# Patient Record
Sex: MALE | Race: WHITE | NOT HISPANIC OR LATINO
[De-identification: names, ages, dates, MRNs, and addresses within clinical notes are randomized per-mention and may not be internally consistent; named-entity substitution may affect disease eponyms.]

---

## 2018-02-28 ENCOUNTER — APPOINTMENT (OUTPATIENT)
Dept: DERMATOLOGY | Facility: CLINIC | Age: 58
End: 2018-02-28
Payer: MEDICAID

## 2018-02-28 DIAGNOSIS — Z41.1 ENCOUNTER FOR COSMETIC SURGERY: ICD-10-CM

## 2018-02-28 DIAGNOSIS — L91.8 OTHER HYPERTROPHIC DISORDERS OF THE SKIN: ICD-10-CM

## 2018-02-28 DIAGNOSIS — L57.0 ACTINIC KERATOSIS: ICD-10-CM

## 2018-02-28 DIAGNOSIS — L57.8 OTHER SKIN CHANGES DUE TO CHRONIC EXPOSURE TO NONIONIZING RADIATION: ICD-10-CM

## 2018-02-28 PROCEDURE — 17000 DESTRUCT PREMALG LESION: CPT

## 2018-02-28 PROCEDURE — D0125: CPT | Mod: 59

## 2018-02-28 PROCEDURE — 99203 OFFICE O/P NEW LOW 30 MIN: CPT | Mod: 25

## 2018-02-28 PROCEDURE — 17003 DESTRUCT PREMALG LES 2-14: CPT

## 2018-03-12 ENCOUNTER — EMERGENCY (EMERGENCY)
Facility: HOSPITAL | Age: 58
LOS: 0 days | Discharge: HOME | End: 2018-03-12
Attending: EMERGENCY MEDICINE | Admitting: EMERGENCY MEDICINE
Payer: MEDICAID

## 2018-03-12 VITALS
DIASTOLIC BLOOD PRESSURE: 97 MMHG | SYSTOLIC BLOOD PRESSURE: 165 MMHG | TEMPERATURE: 98 F | HEART RATE: 73 BPM | RESPIRATION RATE: 19 BRPM | OXYGEN SATURATION: 95 %

## 2018-03-12 VITALS — DIASTOLIC BLOOD PRESSURE: 79 MMHG | SYSTOLIC BLOOD PRESSURE: 153 MMHG | HEART RATE: 65 BPM

## 2018-03-12 DIAGNOSIS — R42 DIZZINESS AND GIDDINESS: ICD-10-CM

## 2018-03-12 DIAGNOSIS — R07.9 CHEST PAIN, UNSPECIFIED: ICD-10-CM

## 2018-03-12 DIAGNOSIS — M54.9 DORSALGIA, UNSPECIFIED: ICD-10-CM

## 2018-03-12 DIAGNOSIS — S83.512S SPRAIN OF ANTERIOR CRUCIATE LIGAMENT OF LEFT KNEE, SEQUELA: Chronic | ICD-10-CM

## 2018-03-12 DIAGNOSIS — I10 ESSENTIAL (PRIMARY) HYPERTENSION: ICD-10-CM

## 2018-03-12 DIAGNOSIS — J34.2 DEVIATED NASAL SEPTUM: Chronic | ICD-10-CM

## 2018-03-12 DIAGNOSIS — G47.30 SLEEP APNEA, UNSPECIFIED: ICD-10-CM

## 2018-03-12 LAB
ALBUMIN SERPL ELPH-MCNC: 4.2 G/DL — SIGNIFICANT CHANGE UP (ref 3.3–5)
ALP SERPL-CCNC: 107 U/L — SIGNIFICANT CHANGE UP (ref 40–120)
ALT FLD-CCNC: 75 U/L — SIGNIFICANT CHANGE UP (ref 12–78)
ANION GAP SERPL CALC-SCNC: 6 MMOL/L — SIGNIFICANT CHANGE UP (ref 5–17)
APTT BLD: 33.3 SEC — SIGNIFICANT CHANGE UP (ref 27.5–37.4)
AST SERPL-CCNC: 34 U/L — SIGNIFICANT CHANGE UP (ref 15–37)
BASOPHILS # BLD AUTO: 0.06 K/UL — SIGNIFICANT CHANGE UP (ref 0–0.2)
BASOPHILS NFR BLD AUTO: 0.8 % — SIGNIFICANT CHANGE UP (ref 0–2)
BILIRUB SERPL-MCNC: 0.5 MG/DL — SIGNIFICANT CHANGE UP (ref 0.2–1.2)
BUN SERPL-MCNC: 16 MG/DL — SIGNIFICANT CHANGE UP (ref 7–23)
CALCIUM SERPL-MCNC: 9.1 MG/DL — SIGNIFICANT CHANGE UP (ref 8.5–10.1)
CHLORIDE SERPL-SCNC: 109 MMOL/L — HIGH (ref 96–108)
CO2 SERPL-SCNC: 29 MMOL/L — SIGNIFICANT CHANGE UP (ref 22–31)
CREAT SERPL-MCNC: 1.04 MG/DL — SIGNIFICANT CHANGE UP (ref 0.5–1.3)
EOSINOPHIL # BLD AUTO: 0.1 K/UL — SIGNIFICANT CHANGE UP (ref 0–0.5)
EOSINOPHIL NFR BLD AUTO: 1.3 % — SIGNIFICANT CHANGE UP (ref 0–6)
GLUCOSE SERPL-MCNC: 80 MG/DL — SIGNIFICANT CHANGE UP (ref 70–99)
HCT VFR BLD CALC: 52.1 % — HIGH (ref 39–50)
HGB BLD-MCNC: 17.3 G/DL — HIGH (ref 13–17)
IMM GRANULOCYTES NFR BLD AUTO: 0.4 % — SIGNIFICANT CHANGE UP (ref 0–1.5)
INR BLD: 1.01 RATIO — SIGNIFICANT CHANGE UP (ref 0.88–1.16)
LYMPHOCYTES # BLD AUTO: 1.79 K/UL — SIGNIFICANT CHANGE UP (ref 1–3.3)
LYMPHOCYTES # BLD AUTO: 22.7 % — SIGNIFICANT CHANGE UP (ref 13–44)
MCHC RBC-ENTMCNC: 31.6 PG — SIGNIFICANT CHANGE UP (ref 27–34)
MCHC RBC-ENTMCNC: 33.2 GM/DL — SIGNIFICANT CHANGE UP (ref 32–36)
MCV RBC AUTO: 95.1 FL — SIGNIFICANT CHANGE UP (ref 80–100)
MONOCYTES # BLD AUTO: 0.87 K/UL — SIGNIFICANT CHANGE UP (ref 0–0.9)
MONOCYTES NFR BLD AUTO: 11 % — SIGNIFICANT CHANGE UP (ref 2–14)
NEUTROPHILS # BLD AUTO: 5.05 K/UL — SIGNIFICANT CHANGE UP (ref 1.8–7.4)
NEUTROPHILS NFR BLD AUTO: 63.8 % — SIGNIFICANT CHANGE UP (ref 43–77)
NRBC # BLD: 0 /100 WBCS — SIGNIFICANT CHANGE UP (ref 0–0)
PLATELET # BLD AUTO: 162 K/UL — SIGNIFICANT CHANGE UP (ref 150–400)
POTASSIUM SERPL-MCNC: 4.5 MMOL/L — SIGNIFICANT CHANGE UP (ref 3.5–5.3)
POTASSIUM SERPL-SCNC: 4.5 MMOL/L — SIGNIFICANT CHANGE UP (ref 3.5–5.3)
PROT SERPL-MCNC: 7.5 GM/DL — SIGNIFICANT CHANGE UP (ref 6–8.3)
PROTHROM AB SERPL-ACNC: 10.9 SEC — SIGNIFICANT CHANGE UP (ref 9.8–12.7)
RBC # BLD: 5.48 M/UL — SIGNIFICANT CHANGE UP (ref 4.2–5.8)
RBC # FLD: 12.4 % — SIGNIFICANT CHANGE UP (ref 10.3–14.5)
SODIUM SERPL-SCNC: 144 MMOL/L — SIGNIFICANT CHANGE UP (ref 135–145)
TROPONIN I SERPL-MCNC: <0.015 NG/ML — SIGNIFICANT CHANGE UP (ref 0.01–0.04)
TROPONIN I SERPL-MCNC: <0.015 NG/ML — SIGNIFICANT CHANGE UP (ref 0.01–0.04)
WBC # BLD: 7.9 K/UL — SIGNIFICANT CHANGE UP (ref 3.8–10.5)
WBC # FLD AUTO: 7.9 K/UL — SIGNIFICANT CHANGE UP (ref 3.8–10.5)

## 2018-03-12 PROCEDURE — 71045 X-RAY EXAM CHEST 1 VIEW: CPT | Mod: 26

## 2018-03-12 PROCEDURE — 93010 ELECTROCARDIOGRAM REPORT: CPT

## 2018-03-12 PROCEDURE — 99285 EMERGENCY DEPT VISIT HI MDM: CPT

## 2018-03-12 RX ORDER — SODIUM CHLORIDE 9 MG/ML
3 INJECTION INTRAMUSCULAR; INTRAVENOUS; SUBCUTANEOUS ONCE
Qty: 0 | Refills: 0 | Status: COMPLETED | OUTPATIENT
Start: 2018-03-12 | End: 2018-03-12

## 2018-03-12 RX ORDER — ASPIRIN/CALCIUM CARB/MAGNESIUM 324 MG
325 TABLET ORAL ONCE
Qty: 0 | Refills: 0 | Status: COMPLETED | OUTPATIENT
Start: 2018-03-12 | End: 2018-03-12

## 2018-03-12 RX ADMIN — Medication 325 MILLIGRAM(S): at 19:30

## 2018-03-12 RX ADMIN — SODIUM CHLORIDE 3 MILLILITER(S): 9 INJECTION INTRAMUSCULAR; INTRAVENOUS; SUBCUTANEOUS at 19:30

## 2018-03-12 NOTE — ED PROVIDER NOTE - NS_ ATTENDINGSCRIBEDETAILS _ED_A_ED_FT
I, Taz Graham MD,  performed the initial face to face bedside interview with this patient regarding history of present illness, review of symptoms and relevant past medical, social and family history.  I completed an independent physical examination.    The history, relevant review of systems, past medical and surgical history, medical decision making, and physical examination was documented by the scribe in my presence and I attest to the accuracy of the documentation.

## 2018-03-12 NOTE — ED STATDOCS - PROGRESS NOTE DETAILS
Anastasiia Rogelio Jaffe: 57 y-o Male with PMHX of HTN presents to the ED c/o of worsening Chest tightness and dizziness x 2 weeks. Pt notes pain is to the left of sternum. Sent by Dr. Rousseau for EKG. Pt notes numbness and tingling to b/l hands. Pain worsens with movement with exertional dyspnea / SOB. + mild diaphoresis. Denies N/V, Cough, leg swelling. Took Asprin 80 mg prior to arrival. Pt will be sent to main for further evaluation.

## 2018-03-12 NOTE — ED PROVIDER NOTE - CARDIAC, MLM
Normal rate, regular rhythm.  Heart sounds S1, S2.  No murmurs, rubs or gallops. +tender to left costochondral angle

## 2018-03-12 NOTE — ED ADULT NURSE REASSESSMENT NOTE - NS ED NURSE REASSESS COMMENT FT1
report received from Joanne Bloom RN and resumed care of patient at 1930. pt aaox4 resting comfortably on stretcher, awaiting 2nd set of troponin to be drawn around 2200pm.

## 2018-03-12 NOTE — ED PROVIDER NOTE - PROGRESS NOTE DETAILS
Spoke with patient who states that he has not followed with cardio and also that he has not used CPAP machine over night in years. Pt states he will start using the machine and follow with cardio referred to.

## 2018-03-12 NOTE — ED STATDOCS - OBJECTIVE STATEMENT
57 y-o Male with PMHX of HTN presents to the ED c/o of worsening Chest tightness and dizziness x 2 weeks. Pt notes pain is to the left of sternum. Sent by Dr. Rousseau for EKG. Pt notes numbness and tingling to b/l hands. Pain worsens with movement with exertional dyspnea / SOB. + mild diaphoresis. Denies N/V, Cough, leg swelling. Took Asprin 80 mg prior to arrival. Pt will be sent to main for further evaluation.

## 2018-05-16 ENCOUNTER — TRANSCRIPTION ENCOUNTER (OUTPATIENT)
Age: 58
End: 2018-05-16

## 2018-05-23 ENCOUNTER — TRANSCRIPTION ENCOUNTER (OUTPATIENT)
Age: 58
End: 2018-05-23

## 2019-02-27 ENCOUNTER — APPOINTMENT (OUTPATIENT)
Dept: DERMATOLOGY | Facility: CLINIC | Age: 59
End: 2019-02-27

## 2019-04-30 ENCOUNTER — INPATIENT (INPATIENT)
Facility: HOSPITAL | Age: 59
LOS: 0 days | Discharge: HOME | End: 2019-05-01
Attending: INTERNAL MEDICINE | Admitting: INTERNAL MEDICINE
Payer: MEDICAID

## 2019-04-30 VITALS — HEIGHT: 67 IN | WEIGHT: 285.06 LBS

## 2019-04-30 DIAGNOSIS — S83.512S SPRAIN OF ANTERIOR CRUCIATE LIGAMENT OF LEFT KNEE, SEQUELA: Chronic | ICD-10-CM

## 2019-04-30 DIAGNOSIS — J34.2 DEVIATED NASAL SEPTUM: Chronic | ICD-10-CM

## 2019-04-30 PROBLEM — M94.0 CHONDROCOSTAL JUNCTION SYNDROME [TIETZE]: Chronic | Status: ACTIVE | Noted: 2018-03-12

## 2019-04-30 PROBLEM — G47.30 SLEEP APNEA, UNSPECIFIED: Chronic | Status: ACTIVE | Noted: 2018-03-12

## 2019-04-30 PROBLEM — I10 ESSENTIAL (PRIMARY) HYPERTENSION: Chronic | Status: ACTIVE | Noted: 2018-03-12

## 2019-04-30 LAB
ADD ON TEST-SPECIMEN IN LAB: SIGNIFICANT CHANGE UP
ALBUMIN SERPL ELPH-MCNC: 4 G/DL — SIGNIFICANT CHANGE UP (ref 3.3–5)
ALP SERPL-CCNC: 110 U/L — SIGNIFICANT CHANGE UP (ref 40–120)
ALT FLD-CCNC: 53 U/L — SIGNIFICANT CHANGE UP (ref 12–78)
ANION GAP SERPL CALC-SCNC: 4 MMOL/L — LOW (ref 5–17)
APTT BLD: 33.5 SEC — SIGNIFICANT CHANGE UP (ref 27.5–36.3)
AST SERPL-CCNC: 23 U/L — SIGNIFICANT CHANGE UP (ref 15–37)
BASOPHILS # BLD AUTO: 0.04 K/UL — SIGNIFICANT CHANGE UP (ref 0–0.2)
BASOPHILS NFR BLD AUTO: 0.5 % — SIGNIFICANT CHANGE UP (ref 0–2)
BILIRUB SERPL-MCNC: 0.5 MG/DL — SIGNIFICANT CHANGE UP (ref 0.2–1.2)
BUN SERPL-MCNC: 14 MG/DL — SIGNIFICANT CHANGE UP (ref 7–23)
CALCIUM SERPL-MCNC: 9.2 MG/DL — SIGNIFICANT CHANGE UP (ref 8.5–10.1)
CHLORIDE SERPL-SCNC: 110 MMOL/L — HIGH (ref 96–108)
CO2 SERPL-SCNC: 30 MMOL/L — SIGNIFICANT CHANGE UP (ref 22–31)
CREAT SERPL-MCNC: 0.99 MG/DL — SIGNIFICANT CHANGE UP (ref 0.5–1.3)
EOSINOPHIL # BLD AUTO: 0.11 K/UL — SIGNIFICANT CHANGE UP (ref 0–0.5)
EOSINOPHIL NFR BLD AUTO: 1.3 % — SIGNIFICANT CHANGE UP (ref 0–6)
GLUCOSE SERPL-MCNC: 85 MG/DL — SIGNIFICANT CHANGE UP (ref 70–99)
HCT VFR BLD CALC: 52.2 % — HIGH (ref 39–50)
HGB BLD-MCNC: 17.3 G/DL — HIGH (ref 13–17)
IMM GRANULOCYTES NFR BLD AUTO: 0.6 % — SIGNIFICANT CHANGE UP (ref 0–1.5)
INR BLD: 0.94 RATIO — SIGNIFICANT CHANGE UP (ref 0.88–1.16)
LYMPHOCYTES # BLD AUTO: 1.45 K/UL — SIGNIFICANT CHANGE UP (ref 1–3.3)
LYMPHOCYTES # BLD AUTO: 17.4 % — SIGNIFICANT CHANGE UP (ref 13–44)
MCHC RBC-ENTMCNC: 31.9 PG — SIGNIFICANT CHANGE UP (ref 27–34)
MCHC RBC-ENTMCNC: 33.1 GM/DL — SIGNIFICANT CHANGE UP (ref 32–36)
MCV RBC AUTO: 96.1 FL — SIGNIFICANT CHANGE UP (ref 80–100)
MONOCYTES # BLD AUTO: 0.79 K/UL — SIGNIFICANT CHANGE UP (ref 0–0.9)
MONOCYTES NFR BLD AUTO: 9.5 % — SIGNIFICANT CHANGE UP (ref 2–14)
NEUTROPHILS # BLD AUTO: 5.87 K/UL — SIGNIFICANT CHANGE UP (ref 1.8–7.4)
NEUTROPHILS NFR BLD AUTO: 70.7 % — SIGNIFICANT CHANGE UP (ref 43–77)
NRBC # BLD: 0 /100 WBCS — SIGNIFICANT CHANGE UP (ref 0–0)
PLATELET # BLD AUTO: 171 K/UL — SIGNIFICANT CHANGE UP (ref 150–400)
POTASSIUM SERPL-MCNC: 4.3 MMOL/L — SIGNIFICANT CHANGE UP (ref 3.5–5.3)
POTASSIUM SERPL-SCNC: 4.3 MMOL/L — SIGNIFICANT CHANGE UP (ref 3.5–5.3)
PROT SERPL-MCNC: 6.9 GM/DL — SIGNIFICANT CHANGE UP (ref 6–8.3)
PROTHROM AB SERPL-ACNC: 10.4 SEC — SIGNIFICANT CHANGE UP (ref 10–12.9)
RBC # BLD: 5.43 M/UL — SIGNIFICANT CHANGE UP (ref 4.2–5.8)
RBC # FLD: 12.1 % — SIGNIFICANT CHANGE UP (ref 10.3–14.5)
SODIUM SERPL-SCNC: 144 MMOL/L — SIGNIFICANT CHANGE UP (ref 135–145)
TROPONIN I SERPL-MCNC: <0.015 NG/ML — SIGNIFICANT CHANGE UP (ref 0.01–0.04)
WBC # BLD: 8.31 K/UL — SIGNIFICANT CHANGE UP (ref 3.8–10.5)
WBC # FLD AUTO: 8.31 K/UL — SIGNIFICANT CHANGE UP (ref 3.8–10.5)

## 2019-04-30 PROCEDURE — 93010 ELECTROCARDIOGRAM REPORT: CPT

## 2019-04-30 PROCEDURE — 99285 EMERGENCY DEPT VISIT HI MDM: CPT

## 2019-04-30 PROCEDURE — 71045 X-RAY EXAM CHEST 1 VIEW: CPT | Mod: 26

## 2019-04-30 RX ORDER — ACETAMINOPHEN 500 MG
650 TABLET ORAL EVERY 6 HOURS
Qty: 0 | Refills: 0 | Status: DISCONTINUED | OUTPATIENT
Start: 2019-04-30 | End: 2019-05-01

## 2019-04-30 RX ORDER — ONDANSETRON 8 MG/1
4 TABLET, FILM COATED ORAL EVERY 6 HOURS
Qty: 0 | Refills: 0 | Status: DISCONTINUED | OUTPATIENT
Start: 2019-04-30 | End: 2019-05-01

## 2019-04-30 RX ORDER — SODIUM CHLORIDE 9 MG/ML
1000 INJECTION INTRAMUSCULAR; INTRAVENOUS; SUBCUTANEOUS
Qty: 0 | Refills: 0 | Status: DISCONTINUED | OUTPATIENT
Start: 2019-04-30 | End: 2019-05-01

## 2019-04-30 RX ORDER — METOPROLOL TARTRATE 50 MG
25 TABLET ORAL
Qty: 0 | Refills: 0 | Status: DISCONTINUED | OUTPATIENT
Start: 2019-04-30 | End: 2019-05-01

## 2019-04-30 RX ORDER — ASPIRIN/CALCIUM CARB/MAGNESIUM 324 MG
81 TABLET ORAL DAILY
Qty: 0 | Refills: 0 | Status: DISCONTINUED | OUTPATIENT
Start: 2019-04-30 | End: 2019-05-01

## 2019-04-30 RX ORDER — AMLODIPINE BESYLATE 2.5 MG/1
10 TABLET ORAL DAILY
Qty: 0 | Refills: 0 | Status: DISCONTINUED | OUTPATIENT
Start: 2019-04-30 | End: 2019-05-01

## 2019-04-30 RX ORDER — AMLODIPINE BESYLATE 2.5 MG/1
1 TABLET ORAL
Qty: 0 | Refills: 0 | COMMUNITY

## 2019-04-30 RX ADMIN — Medication 81 MILLIGRAM(S): at 15:50

## 2019-04-30 RX ADMIN — SODIUM CHLORIDE 75 MILLILITER(S): 9 INJECTION INTRAMUSCULAR; INTRAVENOUS; SUBCUTANEOUS at 21:10

## 2019-04-30 NOTE — ED ADULT TRIAGE NOTE - CHIEF COMPLAINT QUOTE
c/o chest tightness, pt states episode started after taking quinipril and 30 minutes later started to have chest tightness lasting for 4-5 hours, denies shortness of breath, dizziness and nausea, pt went to doctor mic's office today and had EKG completed which was abnormal, was told he has right bundle branch block and sent to ER for consult with dr. haile

## 2019-04-30 NOTE — ED ADULT NURSE REASSESSMENT NOTE - NS ED NURSE REASSESS COMMENT FT1
Patient care received from RN Sean BALDERAS. Patient A&Ox4, resting comfortably in bed. VSS, denies pain/discomfort. No s/s of distress present. Wait time explained, hospitalist consult pending. Safety & comfort measures in place, spouse bedside. Will continue to monitor.

## 2019-04-30 NOTE — CONSULT NOTE ADULT - SUBJECTIVE AND OBJECTIVE BOX
Patient is a 59y old  Male who presents with a chief complaint of     HPI:  Cardio: 59 year old male history of htn had episode of chest tightness 4 days ago approx 40 min after having quinapril pill. felt flushed and red. tightness seemed to last several hours. no dyspna or palps. Patient also describes episode of exertional dyspnea climbing ladder at work approx 2 weeks ago. Past history sig for prior nuc stress which was abnormal. (results na). no pnd orthopnea or le edema.    PAST MEDICAL & SURGICAL HISTORY:  Costochondritis  Sleep apnea  HTN (hypertension)  Rupture of anterior cruciate ligament of left knee, sequela  Deviated nasal septum      PREVIOUS DIAGNOSTIC TESTING:      ECHO  FINDINGS:    STRESS  FINDINGS:    CATHETERIZATION  FINDINGS:    MEDICATIONS  (STANDING):  aspirin  chewable 81 milliGRAM(s) Oral daily    MEDICATIONS  (PRN):      FAMILY HISTORY:      SOCIAL HISTORY:  ***    REVIEW OF SYSTEM:  Pertinent items are noted in HPI.  Constitutional  Eyes:    Ears, nose, mouth,   throat, and face:    Neck:   Respiratory:   and wheezing  Cardiovascular:    Gastrointestinal:  Genitourinary:     Hematologic/lymphatic:   Musculoskeletal:   Neurological:   Behavioral/Psych:        Vital Signs Last 24 Hrs  T(C): 36.6 (30 Apr 2019 17:03), Max: 36.9 (30 Apr 2019 13:34)  T(F): 97.8 (30 Apr 2019 17:03), Max: 98.4 (30 Apr 2019 13:34)  HR: 67 (30 Apr 2019 17:03) (67 - 73)  BP: 146/77 (30 Apr 2019 17:03) (146/77 - 158/76)  BP(mean): --  RR: 16 (30 Apr 2019 17:03) (16 - 19)  SpO2: 97% (30 Apr 2019 17:03) (97% - 98%)    I&O's Summary    PHYSICAL EXAM  General Appearance: cooperative, no acute distress,   HEENT: PERRL, conjunctiva clear, EOM's intact,     Neck: Supple, , no adenopathy, thyroid: not enlarged, no carotid bruit or JVD  Back: Symmetric, no  tenderness,no soft tissue tenderness  Lungs: Clear to auscultation bilaterally,no adventitious breath sounds, normal   expiratory phase  Heart: Regular rate and rhythm, S1, S2 normal, no murmur,   Abdomen: Soft, non-tender, bowel sounds active , no hepatosplenomegaly  Extremities: no cyanosis or edema, no joint swelling  Skin  Neurologic: Alert and oriented X3 ,        INTERPRETATION OF TELEMETRY:    ECG:        LABS:                          17.3   8.31  )-----------( 171      ( 30 Apr 2019 13:40 )             52.2     04-30    144  |  110<H>  |  14  ----------------------------<  85  4.3   |  30  |  0.99    Ca    9.2      30 Apr 2019 13:40    TPro  6.9  /  Alb  4.0  /  TBili  0.5  /  DBili  x   /  AST  23  /  ALT  53  /  AlkPhos  110  04-30    CARDIAC MARKERS ( 30 Apr 2019 13:40 )  <0.015 ng/mL / x     / x     / x     / x              PT/INR - ( 30 Apr 2019 13:40 )   PT: 10.4 sec;   INR: 0.94 ratio         PTT - ( 30 Apr 2019 13:40 )  PTT:33.5 sec          RADIOLOGY & ADDITIONAL STUDIES:    IMPRESSION:    PLAN:

## 2019-04-30 NOTE — ED PROVIDER NOTE - OBJECTIVE STATEMENT
60 y/o M with a PMHx of HTN presenting to the ED c/o chest tightness on exertion. Pt states episode started after taking Quinipril and 30 minutes later started to have chest tightness lasting for 4-5 hours. Pt went to Dr. Ferreira's office today and had EKG completed which was abnormal, was told he has right bundle branch block and sent to ER for consult with Dr. Ferreira. Denies SOB, dizziness, previous heart surgeries, recent travel, h/o blood clots, and nausea. PMD: Dr. Ferreira. Cardiologist: Dr. Webb. Allergy: Codeine. 58 y/o M with a PMHx of HTN presenting to the ED c/o chest tightness on exertion. Pt states episode started after taking Quinipril and 30 minutes later started to have chest tightness lasting for 4-5 hours. Pt went to Dr. Ferreira's office today and had EKG completed which was abnormal, was told he has right bundle branch block and sent to ER for consult with Dr. Ferreira. Denies SOB, dizziness, previous heart surgeries, recent travel, h/o blood clots, and nausea. PMD: Dr. Ferreira. Cardiologist: Dr. Webb. Allergy: Codeine.    pt sent in for rule our acute coronary syndrome and coronary catheterization tomorrow

## 2019-04-30 NOTE — ED ADULT NURSE REASSESSMENT NOTE - NS ED NURSE REASSESS COMMENT FT1
pt received from previous RN Silvana. AOX3, denies pain. no s/s of acute distress noted. vss. bed assigned on 3N, pending report. POC reviewed with pt, verbalizes understanding. will continue to monitor

## 2019-04-30 NOTE — ED ADULT TRIAGE NOTE - MODE OF ARRIVAL
Walk in
I have reviewed and confirmed nurses' notes for patient's medications, allergies, medical history, and surgical history.

## 2019-04-30 NOTE — ED PROVIDER NOTE - PHYSICAL EXAMINATION
nml     M w refractory HTN p/w chest tightr sent for card cath tomorrow. card enzymes and admit. disc dr sac card cath done tmr.

## 2019-04-30 NOTE — H&P ADULT - NSICDXPASTSURGICALHX_GEN_ALL_CORE_FT
PAST SURGICAL HISTORY:  Deviated nasal septum     Rupture of anterior cruciate ligament of left knee, sequela

## 2019-04-30 NOTE — ED ADULT NURSE REASSESSMENT NOTE - NS ED NURSE REASSESS COMMENT FT1
Patient remains as previously assessed. No s/s of distress or reports of pain on my time. Plan of care discussed. Hand-off report to BELINDA INFANTE Safety & comfort measures in place, purposeful active rounding on my time.

## 2019-04-30 NOTE — H&P ADULT - ASSESSMENT
# CP r/o ACS  # hx of positive stress test in past, may need angiogram  # HTN  # JULISSA not compliant with CPAP    Plan  - cont amlodipine  - cardio eval appreciated, possible angio in AM  - weight loss, exercise counseling  - check lipid panel  - complaince with cpap  - hold quinapril given the symptoms occured after taking it    DC after cath

## 2019-04-30 NOTE — H&P ADULT - HISTORY OF PRESENT ILLNESS
58 y/o M with a PMHx of HTN, JULISSA on CPAP, hx of positive stress test 2010 presenting to the ED c/o chest tightness that he experienced after taking quinapril for the first time on saturday. The CP lasted for 6-7 hours during which time he was ambulating, driving, seeing his son in the hospital. Denies nausea/radiation. The CP was "pressure like", felt like "elephant sitting on his chest". He has had similar chest tightnesss 2 weeks ago at work when coming off a ladder with his tools. He has a hx of positive stress test in 2010 and deferred angiogram at that time.

## 2019-04-30 NOTE — CONSULT NOTE ADULT - ASSESSMENT
Imp:  1. Chest pain, possible unstable angina  2. HTN    Plan; admit  asa bb   serial ecg and enzymes  If pain reccurs start iv heparin  early cath.

## 2019-04-30 NOTE — ED PROVIDER NOTE - CLINICAL SUMMARY MEDICAL DECISION MAKING FREE TEXT BOX
Male with refractory HTN p/w chest tightness, sent by cardiology for cardiac cath tomorrow. Will obtain cardiac enzymes and admit. Discussed with Dr. Ferreira who said cardiac cath will be done tomorrow.

## 2019-05-01 ENCOUNTER — OUTPATIENT (OUTPATIENT)
Dept: OUTPATIENT SERVICES | Facility: HOSPITAL | Age: 59
LOS: 1 days | End: 2019-05-01
Payer: MEDICAID

## 2019-05-01 ENCOUNTER — TRANSCRIPTION ENCOUNTER (OUTPATIENT)
Age: 59
End: 2019-05-01

## 2019-05-01 VITALS
DIASTOLIC BLOOD PRESSURE: 71 MMHG | OXYGEN SATURATION: 93 % | SYSTOLIC BLOOD PRESSURE: 136 MMHG | HEART RATE: 66 BPM | RESPIRATION RATE: 18 BRPM | TEMPERATURE: 98 F

## 2019-05-01 DIAGNOSIS — J34.2 DEVIATED NASAL SEPTUM: Chronic | ICD-10-CM

## 2019-05-01 DIAGNOSIS — S83.512S SPRAIN OF ANTERIOR CRUCIATE LIGAMENT OF LEFT KNEE, SEQUELA: Chronic | ICD-10-CM

## 2019-05-01 LAB
ALBUMIN SERPL ELPH-MCNC: 3.4 G/DL — SIGNIFICANT CHANGE UP (ref 3.3–5)
ALP SERPL-CCNC: 89 U/L — SIGNIFICANT CHANGE UP (ref 40–120)
ALT FLD-CCNC: 47 U/L — SIGNIFICANT CHANGE UP (ref 12–78)
ANION GAP SERPL CALC-SCNC: 4 MMOL/L — LOW (ref 5–17)
AST SERPL-CCNC: 20 U/L — SIGNIFICANT CHANGE UP (ref 15–37)
BASOPHILS # BLD AUTO: 0.03 K/UL — SIGNIFICANT CHANGE UP (ref 0–0.2)
BASOPHILS NFR BLD AUTO: 0.5 % — SIGNIFICANT CHANGE UP (ref 0–2)
BILIRUB SERPL-MCNC: 0.7 MG/DL — SIGNIFICANT CHANGE UP (ref 0.2–1.2)
BUN SERPL-MCNC: 15 MG/DL — SIGNIFICANT CHANGE UP (ref 7–23)
CALCIUM SERPL-MCNC: 8.5 MG/DL — SIGNIFICANT CHANGE UP (ref 8.5–10.1)
CHLORIDE SERPL-SCNC: 109 MMOL/L — HIGH (ref 96–108)
CHOLEST SERPL-MCNC: 153 MG/DL — SIGNIFICANT CHANGE UP (ref 10–199)
CO2 SERPL-SCNC: 29 MMOL/L — SIGNIFICANT CHANGE UP (ref 22–31)
CREAT SERPL-MCNC: 1.01 MG/DL — SIGNIFICANT CHANGE UP (ref 0.5–1.3)
EOSINOPHIL # BLD AUTO: 0.14 K/UL — SIGNIFICANT CHANGE UP (ref 0–0.5)
EOSINOPHIL NFR BLD AUTO: 2.3 % — SIGNIFICANT CHANGE UP (ref 0–6)
ESTIMATED AVERAGE GLUCOSE: 123 MG/DL — HIGH (ref 68–114)
GLUCOSE SERPL-MCNC: 113 MG/DL — HIGH (ref 70–99)
HBA1C BLD-MCNC: 5.9 % — HIGH (ref 4–5.6)
HCT VFR BLD CALC: 47.9 % — SIGNIFICANT CHANGE UP (ref 39–50)
HCV AB S/CO SERPL IA: 0.06 S/CO — SIGNIFICANT CHANGE UP (ref 0–0.99)
HCV AB SERPL-IMP: SIGNIFICANT CHANGE UP
HDLC SERPL-MCNC: 40 MG/DL — SIGNIFICANT CHANGE UP
HGB BLD-MCNC: 16.1 G/DL — SIGNIFICANT CHANGE UP (ref 13–17)
IMM GRANULOCYTES NFR BLD AUTO: 0.5 % — SIGNIFICANT CHANGE UP (ref 0–1.5)
LIPID PNL WITH DIRECT LDL SERPL: 89 MG/DL — SIGNIFICANT CHANGE UP
LYMPHOCYTES # BLD AUTO: 1.44 K/UL — SIGNIFICANT CHANGE UP (ref 1–3.3)
LYMPHOCYTES # BLD AUTO: 23.5 % — SIGNIFICANT CHANGE UP (ref 13–44)
MCHC RBC-ENTMCNC: 32.3 PG — SIGNIFICANT CHANGE UP (ref 27–34)
MCHC RBC-ENTMCNC: 33.6 GM/DL — SIGNIFICANT CHANGE UP (ref 32–36)
MCV RBC AUTO: 96.2 FL — SIGNIFICANT CHANGE UP (ref 80–100)
MONOCYTES # BLD AUTO: 0.64 K/UL — SIGNIFICANT CHANGE UP (ref 0–0.9)
MONOCYTES NFR BLD AUTO: 10.5 % — SIGNIFICANT CHANGE UP (ref 2–14)
NEUTROPHILS # BLD AUTO: 3.84 K/UL — SIGNIFICANT CHANGE UP (ref 1.8–7.4)
NEUTROPHILS NFR BLD AUTO: 62.7 % — SIGNIFICANT CHANGE UP (ref 43–77)
NRBC # BLD: 0 /100 WBCS — SIGNIFICANT CHANGE UP (ref 0–0)
PLATELET # BLD AUTO: 154 K/UL — SIGNIFICANT CHANGE UP (ref 150–400)
POTASSIUM SERPL-MCNC: 3.7 MMOL/L — SIGNIFICANT CHANGE UP (ref 3.5–5.3)
POTASSIUM SERPL-SCNC: 3.7 MMOL/L — SIGNIFICANT CHANGE UP (ref 3.5–5.3)
PROT SERPL-MCNC: 6.4 GM/DL — SIGNIFICANT CHANGE UP (ref 6–8.3)
RBC # BLD: 4.98 M/UL — SIGNIFICANT CHANGE UP (ref 4.2–5.8)
RBC # FLD: 12.1 % — SIGNIFICANT CHANGE UP (ref 10.3–14.5)
SODIUM SERPL-SCNC: 142 MMOL/L — SIGNIFICANT CHANGE UP (ref 135–145)
TOTAL CHOLESTEROL/HDL RATIO MEASUREMENT: 3.8 RATIO — SIGNIFICANT CHANGE UP (ref 3.4–9.6)
TRIGL SERPL-MCNC: 118 MG/DL — SIGNIFICANT CHANGE UP (ref 10–149)
TROPONIN I SERPL-MCNC: <0.015 NG/ML — SIGNIFICANT CHANGE UP (ref 0.01–0.04)
TROPONIN I SERPL-MCNC: <0.015 NG/ML — SIGNIFICANT CHANGE UP (ref 0.01–0.04)
WBC # BLD: 6.12 K/UL — SIGNIFICANT CHANGE UP (ref 3.8–10.5)
WBC # FLD AUTO: 6.12 K/UL — SIGNIFICANT CHANGE UP (ref 3.8–10.5)

## 2019-05-01 PROCEDURE — G9001: CPT

## 2019-05-01 RX ORDER — METOPROLOL TARTRATE 50 MG
1 TABLET ORAL
Qty: 60 | Refills: 0 | OUTPATIENT
Start: 2019-05-01 | End: 2019-05-30

## 2019-05-01 RX ADMIN — Medication 25 MILLIGRAM(S): at 05:15

## 2019-05-01 RX ADMIN — AMLODIPINE BESYLATE 10 MILLIGRAM(S): 2.5 TABLET ORAL at 05:15

## 2019-05-01 RX ADMIN — Medication 81 MILLIGRAM(S): at 12:47

## 2019-05-01 NOTE — DISCHARGE NOTE PROVIDER - HOSPITAL COURSE
58 y/o M with a PMHx of HTN, JULISSA on CPAP, hx of positive stress test 2010 presenting to the ED c/o chest tightness that he experienced after taking quinapril for the first time on saturday. The CP lasted for 6-7 hours during which time he was ambulating, driving, seeing his son in the hospital. Denies nausea/radiation. The CP was "pressure like", felt like "elephant sitting on his chest". He has had similar chest tightnesss 2 weeks ago at work when coming off a ladder with his tools. He has a hx of positive stress test in 2010 and deferred angiogram at that time.         Hospital course: Patient was admitted to telemetry and was assessed for any arrhythmias. His troponins were normal. He was initiated on ASA and BB. Given the coronaries are normal, he can discuss with his cardiologist if needed SASA 81mg daily. He does have HTN which is difficult to control and was initiated on Metoprolol instead of quinapril for which he had an abnormal reaction.             PHYSICAL EXAM:        Constitutional: NAD, awake and alert, well-developed    HEENT: PERR, EOMI, Normal Hearing, MMM    Neck: Soft and supple, No LAD, No JVD    Respiratory: Breath sounds are clear bilaterally, No wheezing, rales or rhonchi    Cardiovascular: S1 and S2, regular rate and rhythm, no Murmurs, gallops or rubs    Gastrointestinal: Bowel Sounds present, soft, nontender, nondistended, no guarding, no rebound    Extremities: No peripheral edema    Vascular: 2+ peripheral pulses    Neurological: A/O x 3, no focal deficits    Musculoskeletal: 5/5 strength b/l upper and lower extremities    Skin: No rashes        total time for discharge: 45 minutes.

## 2019-05-01 NOTE — PROGRESS NOTE ADULT - ASSESSMENT
Patient now s/p angiogram that revealed:   Diagnostic Conclusions   Normal LV systolic function. Estimated LV ejection fraction is 60 %.   Non obstructive disease of LAD.   Manage with medical therapy.     Recommendations     Manage with medical therapy.   Discharge later today.    - f/u with MD in 1-2 weeks of discharge

## 2019-05-01 NOTE — DISCHARGE NOTE NURSING/CASE MANAGEMENT/SOCIAL WORK - NSDCDPATPORTLINK_GEN_ALL_CORE
You can access the [facility] Portal, offered by [facility], by registering with the following website: [email or web address]

## 2019-05-01 NOTE — PROGRESS NOTE ADULT - SUBJECTIVE AND OBJECTIVE BOX
Patient is a 59y old  Male who presents with a chief complaint of chest tightness (30 Apr 2019 17:23)      HPI:  60 y/o M with a PMHx of HTN, JULISSA on CPAP, hx of positive stress test 2010 presenting to the ED c/o chest tightness that he experienced after taking quinapril for the first time on saturday. The CP lasted for 6-7 hours during which time he was ambulating, driving, seeing his son in the hospital. Denies nausea/radiation. The CP was "pressure like", felt like "elephant sitting on his chest". He has had similar chest tightnesss 2 weeks ago at work when coming off a ladder with his tools. He has a hx of positive stress test in 2010 and deferred angiogram at that time. (30 Apr 2019 17:23)    5/1 no chest pain overnite  PAST MEDICAL & SURGICAL HISTORY:  H/O chest pain  Costochondritis  Sleep apnea  HTN (hypertension)  Rupture of anterior cruciate ligament of left knee, sequela  Deviated nasal septum        MEDICATIONS  (STANDING):  amLODIPine   Tablet 10 milliGRAM(s) Oral daily  aspirin  chewable 81 milliGRAM(s) Oral daily  metoprolol tartrate 25 milliGRAM(s) Oral two times a day  sodium chloride 0.9%. 1000 milliLiter(s) (75 mL/Hr) IV Continuous <Continuous>    MEDICATIONS  (PRN):  acetaminophen   Tablet .. 650 milliGRAM(s) Oral every 6 hours PRN Temp greater or equal to 38C (100.4F), Mild Pain (1 - 3)  ondansetron Injectable 4 milliGRAM(s) IV Push every 6 hours PRN Nausea          Vital Signs Last 24 Hrs  T(C): 36.8 (01 May 2019 07:00), Max: 36.9 (30 Apr 2019 13:34)  T(F): 98.3 (01 May 2019 07:00), Max: 98.4 (30 Apr 2019 13:34)  HR: 60 (01 May 2019 07:00) (60 - 73)  BP: 140/69 (01 May 2019 07:00) (140/69 - 158/76)  BP(mean): 93 (30 Apr 2019 21:11) (93 - 93)  RR: 16 (01 May 2019 07:00) (16 - 20)  SpO2: 98% (01 May 2019 07:00) (94% - 98%)    I&O's Summary    30 Apr 2019 07:01  -  01 May 2019 07:00  --------------------------------------------------------  IN: 675 mL / OUT: 0 mL / NET: 675 mL        PHYSICAL EXAM  General Appearance:nad  HEENT:   Neck:   Back:   Lungs: clear  Heart: [id number]  Abdomen:   Extremities:   Skin:   Neurologic:       INTERPRETATION OF TELEMETRY:    ECG:        LABS:                          16.1   6.12  )-----------( 154      ( 01 May 2019 05:35 )             47.9     05-01    142  |  109<H>  |  15  ----------------------------<  113<H>  3.7   |  29  |  1.01    Ca    8.5      01 May 2019 05:35    TPro  6.4  /  Alb  3.4  /  TBili  0.7  /  DBili  x   /  AST  20  /  ALT  47  /  AlkPhos  89  05-01    CARDIAC MARKERS ( 01 May 2019 05:35 )  <0.015 ng/mL / x     / x     / x     / x      CARDIAC MARKERS ( 30 Apr 2019 23:00 )  <0.015 ng/mL / x     / x     / x     / x      CARDIAC MARKERS ( 30 Apr 2019 13:40 )  <0.015 ng/mL / x     / x     / x     / x              PT/INR - ( 30 Apr 2019 13:40 )   PT: 10.4 sec;   INR: 0.94 ratio         PTT - ( 30 Apr 2019 13:40 )  PTT:33.5 sec          RADIOLOGY & ADDITIONAL STUDIES:

## 2019-05-01 NOTE — DISCHARGE NOTE PROVIDER - CARE PROVIDER_API CALL
Huseyin Rajan)  Cardiovascular Disease; Internal Medicine  [address] [place] [address]  Phone: [phone]  Fax: [phone]  Follow Up Time:     Enrique Ferreira)  Internal Medicine  [address], [address]  [place] [address]  Phone: [phone]  Fax: [phone]  Follow Up Time:

## 2019-05-01 NOTE — PROGRESS NOTE ADULT - SUBJECTIVE AND OBJECTIVE BOX
Cardiology NP     Patient is a 59y old  Male who presents with a chief complaint of chest tightness (01 May 2019 07:15)      HPI:  60 y/o M with a PMHx of HTN, JULISSA on CPAP, hx of positive stress test 2010 presenting to the ED c/o chest tightness that he experienced after taking quinapril for the first time on saturday. The CP lasted for 6-7 hours during which time he was ambulating, driving, seeing his son in the hospital.    PAST MEDICAL & SURGICAL HISTORY:  H/O chest pain  Costochondritis  Sleep apnea  HTN (hypertension)  Rupture of anterior cruciate ligament of left knee, sequela  Deviated nasal septum      MEDICATIONS  (STANDING):  amLODIPine   Tablet 10 milliGRAM(s) Oral daily  aspirin  chewable 81 milliGRAM(s) Oral daily  metoprolol tartrate 25 milliGRAM(s) Oral two times a day  sodium chloride 0.9%. 1000 milliLiter(s) (75 mL/Hr) IV Continuous <Continuous>    MEDICATIONS  (PRN):  acetaminophen   Tablet .. 650 milliGRAM(s) Oral every 6 hours PRN Temp greater or equal to 38C (100.4F), Mild Pain (1 - 3)  ondansetron Injectable 4 milliGRAM(s) IV Push every 6 hours PRN Nausea      Allergies    codeine (Unknown)      REVIEW OF SYSTEMS: As mentioned in HPI all others Negative     Vital Signs Last 24 Hrs  T(C): 36.8 (01 May 2019 07:00), Max: 36.9 (30 Apr 2019 13:34)  T(F): 98.3 (01 May 2019 07:00), Max: 98.4 (30 Apr 2019 13:34)  HR: 60 (01 May 2019 07:00) (60 - 73)  BP: 140/69 (01 May 2019 07:00) (140/69 - 158/76)  BP(mean): 93 (30 Apr 2019 21:11) (93 - 93)  RR: 16 (01 May 2019 07:00) (16 - 20)  SpO2: 98% (01 May 2019 07:00) (94% - 98%)    PHYSICAL EXAM:  NERVOUS SYSTEM:  Alert & Oriented X3, Good concentration  CHEST/LUNG: Clear to auscultation bilaterally  HEART: Regular rate and rhythm; No murmurs, rubs, or gallops  ABDOMEN: Soft, Nontender, Nondistended; Bowel sounds present  EXTREMITIES:  2+ Peripheral Pulses, No clubbing, cyanosis, or edema  SKIN:  right femoral cath site without bleeding or hematoma    LABS:                        16.1   6.12  )-----------( 154      ( 01 May 2019 05:35 )             47.9     05-01    142  |  109<H>  |  15  ----------------------------<  113<H>  3.7   |  29  |  1.01    Ca    8.5      01 May 2019 05:35    TPro  6.4  /  Alb  3.4  /  TBili  0.7  /  DBili  x   /  AST  20  /  ALT  47  /  AlkPhos  89  05-01    PT/INR - ( 30 Apr 2019 13:40 )   PT: 10.4 sec;   INR: 0.94 ratio         PTT - ( 30 Apr 2019 13:40 )  PTT:33.5 sec

## 2019-05-02 DIAGNOSIS — Z71.89 OTHER SPECIFIED COUNSELING: ICD-10-CM

## 2019-05-02 PROBLEM — Z87.898 PERSONAL HISTORY OF OTHER SPECIFIED CONDITIONS: Chronic | Status: ACTIVE | Noted: 2019-04-30

## 2019-05-06 DIAGNOSIS — I10 ESSENTIAL (PRIMARY) HYPERTENSION: ICD-10-CM

## 2019-05-06 DIAGNOSIS — G47.33 OBSTRUCTIVE SLEEP APNEA (ADULT) (PEDIATRIC): ICD-10-CM

## 2019-05-06 DIAGNOSIS — R07.9 CHEST PAIN, UNSPECIFIED: ICD-10-CM

## 2019-05-06 DIAGNOSIS — Z88.5 ALLERGY STATUS TO NARCOTIC AGENT: ICD-10-CM

## 2019-08-14 ENCOUNTER — TRANSCRIPTION ENCOUNTER (OUTPATIENT)
Age: 59
End: 2019-08-14

## 2019-08-22 ENCOUNTER — APPOINTMENT (OUTPATIENT)
Dept: NEUROSURGERY | Facility: CLINIC | Age: 59
End: 2019-08-22
Payer: MEDICAID

## 2019-08-22 VITALS
DIASTOLIC BLOOD PRESSURE: 94 MMHG | BODY MASS INDEX: 45.25 KG/M2 | WEIGHT: 288.31 LBS | SYSTOLIC BLOOD PRESSURE: 152 MMHG | HEIGHT: 67 IN | HEART RATE: 70 BPM | OXYGEN SATURATION: 95 % | TEMPERATURE: 98.2 F

## 2019-08-22 DIAGNOSIS — R29.818 OTHER SYMPTOMS AND SIGNS INVOLVING THE NERVOUS SYSTEM: ICD-10-CM

## 2019-08-22 PROCEDURE — 99204 OFFICE O/P NEW MOD 45 MIN: CPT

## 2019-08-22 NOTE — CONSULT LETTER
[Dear  ___] : Dear  [unfilled], [Courtesy Letter:] : I had the pleasure of seeing your patient, [unfilled], in my office today. [Sincerely,] : Sincerely, [FreeTextEntry2] : Enrique Ferreira\par [address]\par [place] [id number] [FreeTextEntry1] : Mr. Ruelas is a 59-year-old male patient who was seen in the office today in regards to low back pain with radiation down the right leg.\par \par The patient endorses a 6-8 week history of worsening low back pain and bilateral buttock pain. This pain is associated with left-sided electrical shocks down his leg which occur intermittently. The patient recalls a sudden onset of this low back pain and electrical pain after extending his cervical spine to look upwards. In addition, the patient had an electrical shock radiating from the neck down into his lower back. The patient is able to reproduce the symptoms when standing and extending his neck, but not while sitting. Since the onset, the patient has attempted bed rest, chiropractic manipulation, and application of a TENS machine. The patient has also been prescribed a Medrol Dosepak which he stated helped tremendously but his pain. The low back pain is rated at a 3/10 in severity whereas his left-sided leg pain is described more as an electrical shock with dysesthesias down his leg corresponding to an L4 or L5 nerve root dermatomal distribution. The patient denies any bowel or bladder symptoms, and has had increasing difficulty walking secondary to pain. On review of systems, the patient also has numbness radiating into the right hand into his ring finger.\par \par The patient's past medical history is noncontributory. The patient has an intolerance to codeine.\par \par On examination, the patient is alert, oriented, and compliant with the exam. The patient has a prominent 2+ reflex at the right patellar tendon, but I am unable to elicit reflexes on the left patella, or the Achilles tendons bilaterally. The patient demonstrates 5/5 strength in the lower extremities bilaterally. The patient ambulate well. The patient has mild trouble with tandem gait. The patient does not have a Nicole sign or ankle clonus.\par \par I have no new imaging to review today.\par \par Taken together, the patient’s clinical history it is most consistent with a lumbar radiculopathy with muscle spasms. Given that his pain has not improved over the last 6 weeks despite conservative therapies, I recommended an MRI scan to rule out persistent structural pathology. In addition, the radiating pain from the neck down his spine with extension of the neck is consistent with a Lhermitte sign, and recommended an MRI scan of the cervical spine to rule out pathology there. In the interim, I have provided another prescription for a Medrol Dosepak for symptomatic management, and look forward to seeing the patient back in our office once his imaging is complete. [FreeTextEntry3] : Adolph Olguin MD, PhD, FRCSC, FAANS\par \par Attending Neurosurgeon\par  of Neurosurgery\par [facility] [employer] [facility]\par \par [facility]\par [facility] [address],\[place] [address]\par \par Office: [phone]\par       Fax: [phone]\par

## 2019-08-22 NOTE — REVIEW OF SYSTEMS
[Joint Pain] : joint pain [Negative] : Heme/Lymph [FreeTextEntry4] : Ringing in ears [FreeTextEntry5] : Shortness of breath [FreeTextEntry9] : Muscle Pain

## 2019-08-29 ENCOUNTER — FORM ENCOUNTER (OUTPATIENT)
Age: 59
End: 2019-08-29

## 2019-08-30 ENCOUNTER — OUTPATIENT (OUTPATIENT)
Dept: OUTPATIENT SERVICES | Facility: HOSPITAL | Age: 59
LOS: 1 days | End: 2019-08-30
Payer: MEDICAID

## 2019-08-30 ENCOUNTER — APPOINTMENT (OUTPATIENT)
Dept: MRI IMAGING | Facility: CLINIC | Age: 59
End: 2019-08-30

## 2019-08-30 DIAGNOSIS — J34.2 DEVIATED NASAL SEPTUM: Chronic | ICD-10-CM

## 2019-08-30 DIAGNOSIS — S83.512S SPRAIN OF ANTERIOR CRUCIATE LIGAMENT OF LEFT KNEE, SEQUELA: Chronic | ICD-10-CM

## 2019-08-30 DIAGNOSIS — Z00.8 ENCOUNTER FOR OTHER GENERAL EXAMINATION: ICD-10-CM

## 2019-08-30 PROCEDURE — 72148 MRI LUMBAR SPINE W/O DYE: CPT | Mod: 26

## 2019-08-30 PROCEDURE — 72148 MRI LUMBAR SPINE W/O DYE: CPT

## 2019-09-10 ENCOUNTER — APPOINTMENT (OUTPATIENT)
Dept: NEUROSURGERY | Facility: CLINIC | Age: 59
End: 2019-09-10
Payer: MEDICAID

## 2019-09-10 VITALS
DIASTOLIC BLOOD PRESSURE: 83 MMHG | SYSTOLIC BLOOD PRESSURE: 135 MMHG | BODY MASS INDEX: 45.2 KG/M2 | HEIGHT: 67 IN | WEIGHT: 288 LBS

## 2019-09-10 PROCEDURE — 99214 OFFICE O/P EST MOD 30 MIN: CPT

## 2019-09-10 NOTE — CONSULT LETTER
[Dear  ___] : Dear  [unfilled], [Sincerely,] : Sincerely, [FreeTextEntry2] : Enrique Ferreira\par [address]\par [place] [id number]  [FreeTextEntry1] : Mr. Ruelas is a very pleasant patient who is seen in our office today in followup. The patient was previously seen in regards to severe left-sided leg pain. The patient returned today to review his imaging findings. \par \par I am happy to report that, currently, the patient's left leg pain is significantly improved and is rated as a 3/10 in severity. The patient continues to complain of lower back tightness, and left leg numbness. The patient’s frequency and severity of “electrical shocks” have decreased. In addition, the patient continues to have bilateral thigh numbness. \par \par On examination, the patient is alert, oriented, and compliant with the exam. Patient is demonstrated 5/5 strength in lower extremities bilaterally. The patient continues to demonstrate a 2+ reflex of the right patellar tendon. I am unable to elicit reflexes in the left patella or Achilles tendons bilaterally. The patient does not have a Nicole sign her ankle clonus.\par \par The patient's MRI scan of the lumbar spine demonstrates a L4/5 disc herniation causing central stenosis with bilateral foraminal stenosis. The patient was unable to tolerate the cervical MRI scan.\par \par Together, the patient has a clinical history and radiographic findings were consistent with a lumbar radiculopathy secondary to an L4/5 disc herniation. However, the patient is currently improving with conservative therapy. As such, I have recommended to continue his conservative therapy with the addition of physical therapy. I have explained to the patient that he is a candidate for a microdiscectomy should his symptoms persist or worsen. The patient's body habitus and complaints of bilateral thigh numbness is most consistent with meralgia paresthetica. I have recommended weight loss as the primary treatment for this issue. At this time, the patient would like to follow up with our office on an as needed basis and will contact our office as necessary.  [FreeTextEntry3] : Adolph Olguin MD, PhD, FRCSC, FAANS\par \par Attending Neurosurgeon\par  of Neurosurgery\par [facility] [place]\par \par [facility]\par [address],\[place] [address]\par \par Office: [phone]\par Fax: [phone]\par \par

## 2019-09-10 NOTE — REASON FOR VISIT
[Follow-Up: _____] : a [unfilled] follow-up visit [FreeTextEntry1] : review imaging from [facility], lumbar left radiculopathy

## 2019-10-10 ENCOUNTER — APPOINTMENT (OUTPATIENT)
Dept: NEUROSURGERY | Facility: CLINIC | Age: 59
End: 2019-10-10
Payer: MEDICAID

## 2019-10-10 VITALS
HEART RATE: 82 BPM | HEIGHT: 67 IN | BODY MASS INDEX: 45.39 KG/M2 | OXYGEN SATURATION: 97 % | DIASTOLIC BLOOD PRESSURE: 95 MMHG | TEMPERATURE: 98.3 F | WEIGHT: 289.19 LBS | SYSTOLIC BLOOD PRESSURE: 155 MMHG

## 2019-10-10 DIAGNOSIS — M51.16 INTERVERTEBRAL DISC DISORDERS WITH RADICULOPATHY, LUMBAR REGION: ICD-10-CM

## 2019-10-10 DIAGNOSIS — M54.16 RADICULOPATHY, LUMBAR REGION: ICD-10-CM

## 2019-10-10 PROCEDURE — 99214 OFFICE O/P EST MOD 30 MIN: CPT

## 2019-10-11 PROBLEM — M51.16 LUMBAR DISC HERNIATION WITH RADICULOPATHY: Status: ACTIVE | Noted: 2019-10-11

## 2019-10-11 PROBLEM — M54.16 LEFT LUMBAR RADICULOPATHY: Status: ACTIVE | Noted: 2019-08-22

## 2019-10-11 NOTE — CONSULT LETTER
[Dear  ___] : Dear  [unfilled], [Courtesy Letter:] : I had the pleasure of seeing your patient, [unfilled], in my office today. [Sincerely,] : Sincerely, [FreeTextEntry2] : Enrique Ferreira\par [address]\par [place] [address] \par \par  [FreeTextEntry3] : Adolph Olguin MD, PhD, FRCPSC \par Attending Neurosurgeon \par [facility] \par [address], [address]nd floor \[place] [address] \par Office: [phone] \par Fax: [phone]\par \par  [FreeTextEntry1] : Mr. Ruelas is a very pleasant 59-year-old male patient who was seen in our office in regards to low back and left-sided leg pain.\par \par Although the patient had significant improvement compared to his initial visit, the patient is now seemingly plateaued with respect to his pain. The patient continues to have a 3/10 severity pain in the low back bilaterally and occasional radiation to the left leg.\par \par On examination, the patient continues to demonstrate 5/5 strength in the lower extremity is bilaterally. The patient does not have ankle clonus. The patient ambulates well.\par \par I have no new imaging to review today, but the patient's MRI scan of the lumbar spine previously demonstrated an L4/5 disc herniation causing bilateral foraminal stenosis.\par \par Taken together, the patient has a clinical history and radiographic findings most consistent with lumbar radiculopathy secondary to L4/5 disc herniation. I have reiterated to the patient that he is a surgical candidate should conservative managements fail and his pain persists. At this time, the patient would like to attempt conservative management including physical therapy and will be in contact with our office as needed.

## 2019-11-05 ENCOUNTER — RX RENEWAL (OUTPATIENT)
Age: 59
End: 2019-11-05

## 2019-12-09 ENCOUNTER — TRANSCRIPTION ENCOUNTER (OUTPATIENT)
Age: 59
End: 2019-12-09

## 2020-09-07 ENCOUNTER — EMERGENCY (EMERGENCY)
Facility: HOSPITAL | Age: 60
LOS: 0 days | Discharge: HOME | End: 2020-09-07
Attending: EMERGENCY MEDICINE
Payer: COMMERCIAL

## 2020-09-07 VITALS
DIASTOLIC BLOOD PRESSURE: 80 MMHG | HEART RATE: 70 BPM | TEMPERATURE: 98 F | OXYGEN SATURATION: 97 % | RESPIRATION RATE: 18 BRPM | SYSTOLIC BLOOD PRESSURE: 140 MMHG

## 2020-09-07 VITALS — WEIGHT: 281.09 LBS | HEIGHT: 68 IN

## 2020-09-07 DIAGNOSIS — S83.512S SPRAIN OF ANTERIOR CRUCIATE LIGAMENT OF LEFT KNEE, SEQUELA: Chronic | ICD-10-CM
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
## 2020-09-07 DIAGNOSIS — Z96.652 PRESENCE OF LEFT ARTIFICIAL KNEE JOINT: ICD-10-CM
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
## 2020-09-07 DIAGNOSIS — J34.2 DEVIATED NASAL SEPTUM: Chronic | ICD-10-CM
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
## 2020-09-07 DIAGNOSIS — Z88.5 ALLERGY STATUS TO NARCOTIC AGENT: ICD-10-CM

## 2020-09-07 DIAGNOSIS — G47.30 SLEEP APNEA, UNSPECIFIED: ICD-10-CM

## 2020-09-07 DIAGNOSIS — S83.91XA SPRAIN OF UNSPECIFIED SITE OF RIGHT KNEE, INITIAL ENCOUNTER: ICD-10-CM

## 2020-09-07 DIAGNOSIS — Z98.890 OTHER SPECIFIED POSTPROCEDURAL STATES: ICD-10-CM

## 2020-09-07 DIAGNOSIS — M94.0 CHONDROCOSTAL JUNCTION SYNDROME [TIETZE]: ICD-10-CM

## 2020-09-07 DIAGNOSIS — Z79.891 LONG TERM (CURRENT) USE OF OPIATE ANALGESIC: ICD-10-CM

## 2020-09-07 DIAGNOSIS — M25.561 PAIN IN RIGHT KNEE: ICD-10-CM

## 2020-09-07 DIAGNOSIS — I10 ESSENTIAL (PRIMARY) HYPERTENSION: ICD-10-CM

## 2020-09-07 PROCEDURE — 73562 X-RAY EXAM OF KNEE 3: CPT | Mod: RT

## 2020-09-07 PROCEDURE — 93971 EXTREMITY STUDY: CPT | Mod: RT

## 2020-09-07 PROCEDURE — 93971 EXTREMITY STUDY: CPT | Mod: 26,RT

## 2020-09-07 PROCEDURE — 96372 THER/PROPH/DIAG INJ SC/IM: CPT | Mod: XU

## 2020-09-07 PROCEDURE — 99284 EMERGENCY DEPT VISIT MOD MDM: CPT | Mod: 25

## 2020-09-07 PROCEDURE — 99284 EMERGENCY DEPT VISIT MOD MDM: CPT

## 2020-09-07 PROCEDURE — 73562 X-RAY EXAM OF KNEE 3: CPT | Mod: 26,RT

## 2020-09-07 RX ORDER — KETOROLAC TROMETHAMINE 30 MG/ML
30 SYRINGE (ML) INJECTION ONCE
Refills: 0 | Status: DISCONTINUED | OUTPATIENT
Start: 2020-09-07 | End: 2020-09-07

## 2020-09-07 RX ORDER — OXYCODONE AND ACETAMINOPHEN 5; 325 MG/1; MG/1
1 TABLET ORAL ONCE
Refills: 0 | Status: DISCONTINUED | OUTPATIENT
Start: 2020-09-07 | End: 2020-09-07

## 2020-09-07 RX ORDER — ACETAMINOPHEN 500 MG
650 TABLET ORAL ONCE
Refills: 0 | Status: COMPLETED | OUTPATIENT
Start: 2020-09-07 | End: 2020-09-07

## 2020-09-07 RX ADMIN — Medication 650 MILLIGRAM(S): at 12:28

## 2020-09-07 RX ADMIN — OXYCODONE AND ACETAMINOPHEN 1 TABLET(S): 5; 325 TABLET ORAL at 12:28

## 2020-09-07 NOTE — ED STATDOCS - CARE PROVIDER_API CALL
Miguel Angel Childers  ORTHOPAEDIC SURGERY  [address] [place] [address]  Phone: [phone]  Fax: [phone]  Follow Up Time: Urgent

## 2020-09-07 NOTE — ED STATDOCS - PROGRESS NOTE DETAILS
59 y/o M presents with knee pain x 2 week.s pt reports R knee pain that became worse today after helping his friend move. Denies trauma, motor or sensory deficit, fever/chills, CP, SOB, or other complaints at this time. -Giovani Mak PA-C 59 y/o M presents with knee pain x 2 week.s pt reports R knee pain that became worse today after helping his friend move. Denies trauma, motor or sensory deficit, fever/chills, CP, SOB, or other complaints at this time.  Ext: No obvious deformity, no swelling or erythema. NTTP. Paid to R lateral meniscus on knee extension. FROM 5/5 muscle strength,    -Giovani CAPONEC Recommend RICE therapy, FU with orthopedics. -Giovani Mak PA-C

## 2020-09-07 NOTE — ED STATDOCS - PHYSICAL EXAMINATION
*GEN: No acute distress, well appearing   *HEAD: Normocephalic, Atraumatic  *EYES/NOSE: b/l Pupils symmetric & Reactive to ligth, EOMI b/l  *THROAT: airway patent, moist mucous membranes  *NECK: Neck supple  *PULMONARY: No Respiratory distress, symmetric b/l chest rise  *CARDIAC: s1s2, regular rhythm   *ABDOMEN:  Non Tender, Non Distended, soft, no guarding, no rebound, no masses   *BACK: no CVA tenderness, No midline vertebral tenderness to palpation   *EXTREMITIES: symmetric pulses, 2+ DP & radial pulses, no cyanosis, no edema   *SKIN: no rash, no bruising   *NEUROLOGIC: alert,  full active & passive ROM in all 4 extremities,   *PSYCH: appropriate concern about symptoms, pleasant *GEN: No acute distress, well appearing   *HEAD: Normocephalic, Atraumatic  *EYES/NOSE: b/l Pupils symmetric & Reactive to ligth, EOMI b/l  *THROAT: airway patent, moist mucous membranes  *NECK: Neck supple  *PULMONARY: No Respiratory distress, symmetric b/l chest rise  *CARDIAC: s1s2, regular rhythm   *ABDOMEN:  Non Tender, Non Distended, soft, no guarding, no rebound, no masses   *BACK: no CVA tenderness, No midline vertebral tenderness to palpation   *EXTREMITIES: symmetric pulses, 2+ DP & radial pulses, no cyanosis, no edema. Full active and passive ROM right knee. Mild RLE edema. Able to stand but having difficulty walking secondary to pain.  *SKIN: no rash, no bruising   *NEUROLOGIC: alert,   *PSYCH: appropriate concern about symptoms, pleasant

## 2020-09-07 NOTE — ED STATDOCS - NS ED ROS FT
Review of Systems:  	•	CONSTITUTIONAL: no fever  	•	SKIN: no rash  	•	RESPIRATORY: no shortness of breath  	•	CARDIAC: no chest pain  	•	GI:  no abd pain, no nausea, no vomiting, no diarrhea  	•	GENITO-URINARY:  no dysuria  	•	MUSCULOSKELETAL:  no back pain. +right knee pain   	•	NEUROLOGIC: no weakness  	•	ALLERGY: no rhinorrhea  	•	PSYSCHIATRIC: appropriate concern about symptoms

## 2020-09-07 NOTE — ED STATDOCS - OBJECTIVE STATEMENT
Pertinent HPI/PMH/PSH/FHx/SHx and Review of Systems contained within  HPI: 59 y/o male p/w right knee pain. Pt reports his right knee felt "achy" the past 2 weeks. Today, pt was helping his friend move, right knee pain worsened so he came to ED for evaluation.   PMH/PSH relevant for: costochondritis, HTN, sleep apnea, left ACL rupture s/p repair  ROS negative for: fever, Chest pain, SOB, Nausea, vomiting, diarrhea, abdominal pain, dysuria    FamilyHx and SocialHx not otherwise contributory Pertinent HPI/PMH/PSH/FHx/SHx and Review of Systems contained within  HPI: 59 y/o male p/w right knee pain. Pt reports his right knee felt "achy" the past 2 weeks. Today, pt was helping his friend move, right knee pain worsened so he came to ED for evaluation. No direct trauma. No hx of DVT or PE. Did not take meds PTA. Allergies: Codeine.   PMH/PSH relevant for: costochondritis, HTN, sleep apnea, left ACL rupture s/p repair  ROS negative for: fever, Chest pain, SOB, Nausea, vomiting, diarrhea, abdominal pain, dysuria    FamilyHx and SocialHx not otherwise contributory

## 2020-09-07 NOTE — ED STATDOCS - PATIENT PORTAL LINK FT
You can access the FollowMyHealth Patient Portal offered by [facility] by registering at the following website: [email or web address]. By joining [facility] [employer]s FollowMyHealth portal, you will also be able to view your health information using other applications (apps) compatible with our system.

## 2020-09-07 NOTE — ED STATDOCS - ATTENDING CONTRIBUTION TO CARE
I, Umer Mcintosh MD,  performed the initial face to face bedside interview with this patient regarding history of present illness, review of symptoms and relevant past medical, social and family history.  I completed an independent physical examination.  I was the initial provider who evaluated this patient. I have signed out the follow up of any pending tests (i.e. labs, radiological studies) to the ACP.  The ACP will complete the work up, interpret tests, administer medications if necessary and reassess the patient for an appropriate disposition.  If questions arise the ACP is expected to contact me for consultation. In the current work-flow I usually don't get to speak to nor reassess patients for final disposition once I sign the case out to the ACP (Unless otherwise documented).

## 2020-09-07 NOTE — ED STATDOCS - CLINICAL SUMMARY MEDICAL DECISION MAKING FREE TEXT BOX
Right knee pain x2 weeks, worse today after helping someone move heavy objects. No direct trauma. Likely strain vs sprain vs meniscus injury. Will get XR and RLE doppler and consider d/c with ortho follow up. Clinically no evidence of gout or septic arthritis. Right knee pain x2 weeks, worse today after helping someone move heavy objects. No direct trauma. Likely strain vs sprain vs meniscus injury. Will get XR and RLE doppler and consider d/c with ortho follow up. Clinically no evidence of gout nor septic arthritis.

## 2020-09-09 ENCOUNTER — APPOINTMENT (OUTPATIENT)
Dept: ORTHOPEDIC SURGERY | Facility: CLINIC | Age: 60
End: 2020-09-09
Payer: COMMERCIAL

## 2020-09-09 DIAGNOSIS — S83.411A SPRAIN OF MEDIAL COLLATERAL LIGAMENT OF RIGHT KNEE, INITIAL ENCOUNTER: ICD-10-CM

## 2020-09-09 PROCEDURE — 99204 OFFICE O/P NEW MOD 45 MIN: CPT

## 2020-09-09 NOTE — DISCUSSION/SUMMARY
[de-identified] : Assessment: Right knee MCL sprain/ ? internal derangement.\par \par Plan:\par #1. Knee runner brace given for support.\par #2. Continue anti-inflammatories.\par #3. Ice and heat therapy.\par #4. Physical therapy prescription given.\par #5. Followup in 4-6 weeks. If the pain continues consider MRI. All of his questions were answered.

## 2020-09-09 NOTE — PHYSICAL EXAM
[de-identified] : Right Knee Physical Examination:\par \par General: Alert and oriented x3.  In no acute distress.  Pleasant in nature with a normal affect.  No apparent respiratory distress. \par \par Erythema, Warmth, Rubor: Negative\par Swelling/Edema: No swelling present\par ROM: 0-125 degrees\par Clementine's Test: Negative \par Medial Joint Line TTP: Positive\par Lateral Joint Line TTP: Negative\par Lachman Exam/Anterior Drawer/Pivot Shift Test: Negative \par MCL Pain: Positive\par LCL Pain: Negative\par Iliotibial Band Pain: Negative\par Patellofemoral Joint Pain: Negative\par Patellar Tendon TTP: Negative\par Pes Anserinus TTP: Negative\par Homans Sign: Negative\par Posterior Knee Pain: Negative\par Neuro: Intact with no sensory or motor deficits\par  [de-identified] : X-rays of the right knee taken from an outside facility: Negative for fractures or dislocations. No abnormalities seen.

## 2020-09-09 NOTE — HISTORY OF PRESENT ILLNESS
[de-identified] : Yohannes is a 59 yo male who presents with a two week injury to his right knee.  He was helping a friend carry heavy tile upstairs and twisted and injured his right knee.  He presents using a cane for walking assistance and pain over the medial aspect of the knee over the MCL.  He states that the knee feels unstable at this time.  His pain scale is a 4/10.  No other complaints.

## 2020-10-07 ENCOUNTER — APPOINTMENT (OUTPATIENT)
Dept: ORTHOPEDIC SURGERY | Facility: CLINIC | Age: 60
End: 2020-10-07
Payer: COMMERCIAL

## 2020-10-07 DIAGNOSIS — S89.91XA UNSPECIFIED INJURY OF RIGHT LOWER LEG, INITIAL ENCOUNTER: ICD-10-CM

## 2020-10-07 DIAGNOSIS — M25.561 PAIN IN RIGHT KNEE: ICD-10-CM

## 2020-10-07 PROCEDURE — 99213 OFFICE O/P EST LOW 20 MIN: CPT

## 2020-10-07 NOTE — ADDENDUM
[FreeTextEntry1] : I, Crispin Reyes, acted solely as a scribe for Dr. Miguel Angel Childers on this date 10/07/2020 .\par All medical record entries made by the Scribe were at my, Dr. Miguel Angel Childers, direction and personally dictated by me on 10/07/2020 . I have reviewed the chart and agree that the record accurately reflects my personal performance of the history, physical exam, assessment and plan. I have also personally directed, reviewed, and agreed with the chart.

## 2020-10-07 NOTE — PHYSICAL EXAM
[de-identified] : Right Knee Physical Examination:\par \par General: Alert and oriented x3.  In no acute distress.  Pleasant in nature with a normal affect.  No apparent respiratory distress. \par \par Erythema, Warmth, Rubor: Negative\par Swelling/Edema: No swelling present\par ROM: 0-125 degrees\par Clementine's Test: Negative \par Medial Joint Line TTP: Positive\par Lateral Joint Line TTP: Negative\par Lachman Exam/Anterior Drawer/Pivot Shift Test: Negative \par MCL Pain: Positive\par LCL Pain: Negative\par Iliotibial Band Pain: Negative\par Patellofemoral Joint Pain: Negative\par Patellar Tendon TTP: Negative\par Pes Anserinus TTP: Negative\par Homans Sign: Negative\par Posterior Knee Pain: Negative\par Neuro: Intact with no sensory or motor deficits\par  [de-identified] : None new obtained.

## 2020-10-07 NOTE — HISTORY OF PRESENT ILLNESS
[de-identified] : 60 year old male presenting with right knee pain. The patient’s pain is noted to be a 1/10. The pain and swelling are noted to be 98% improved compared to the previous visit. He notes he had an ultrasound and was tested negative for a blood clot. The patient notes having some pain in the back of the knee when walking for long periods. He notes he did not start physical therapy yet. He is currently taking no pain medication. No other complaints at this time.

## 2020-10-07 NOTE — DISCUSSION/SUMMARY
[de-identified] : Today I had a lengthy discussion with the patient regarding their right knee pain. I have addressed all the patient's concerns surrounding the pathology of their condition. A discussion was had about possibly obtaining a new DVT test if there is worsening symptoms, but will hold off at this time. I would like to see the patient back in the office PRN to reassess their condition. The patient understood and verbally agreed to the treatment plan. All of their questions were answered and they were satisfied with the visit. The patient should call the office if they have any questions or experience worsening symptoms.

## 2021-04-03 ENCOUNTER — TRANSCRIPTION ENCOUNTER (OUTPATIENT)
Age: 61
End: 2021-04-03

## 2021-04-06 ENCOUNTER — EMERGENCY (EMERGENCY)
Facility: HOSPITAL | Age: 61
LOS: 0 days | Discharge: HOME | End: 2021-04-06
Attending: EMERGENCY MEDICINE
Payer: COMMERCIAL

## 2021-04-06 VITALS
HEART RATE: 60 BPM | TEMPERATURE: 98 F | SYSTOLIC BLOOD PRESSURE: 147 MMHG | RESPIRATION RATE: 16 BRPM | DIASTOLIC BLOOD PRESSURE: 80 MMHG | OXYGEN SATURATION: 98 %

## 2021-04-06 VITALS
TEMPERATURE: 98 F | HEART RATE: 77 BPM | RESPIRATION RATE: 17 BRPM | SYSTOLIC BLOOD PRESSURE: 148 MMHG | OXYGEN SATURATION: 96 % | DIASTOLIC BLOOD PRESSURE: 88 MMHG

## 2021-04-06 DIAGNOSIS — J34.2 DEVIATED NASAL SEPTUM: Chronic | ICD-10-CM

## 2021-04-06 DIAGNOSIS — G47.30 SLEEP APNEA, UNSPECIFIED: ICD-10-CM

## 2021-04-06 DIAGNOSIS — Z88.5 ALLERGY STATUS TO NARCOTIC AGENT: ICD-10-CM

## 2021-04-06 DIAGNOSIS — Z79.891 LONG TERM (CURRENT) USE OF OPIATE ANALGESIC: ICD-10-CM

## 2021-04-06 DIAGNOSIS — S83.512S SPRAIN OF ANTERIOR CRUCIATE LIGAMENT OF LEFT KNEE, SEQUELA: Chronic | ICD-10-CM

## 2021-04-06 DIAGNOSIS — Z99.89 DEPENDENCE ON OTHER ENABLING MACHINES AND DEVICES: ICD-10-CM

## 2021-04-06 DIAGNOSIS — I10 ESSENTIAL (PRIMARY) HYPERTENSION: ICD-10-CM

## 2021-04-06 DIAGNOSIS — I45.10 UNSPECIFIED RIGHT BUNDLE-BRANCH BLOCK: ICD-10-CM

## 2021-04-06 DIAGNOSIS — R07.89 OTHER CHEST PAIN: ICD-10-CM

## 2021-04-06 DIAGNOSIS — R06.02 SHORTNESS OF BREATH: ICD-10-CM

## 2021-04-06 LAB
ALBUMIN SERPL ELPH-MCNC: 4 G/DL — SIGNIFICANT CHANGE UP (ref 3.3–5)
ALP SERPL-CCNC: 106 U/L — SIGNIFICANT CHANGE UP (ref 40–120)
ALT FLD-CCNC: 51 U/L — SIGNIFICANT CHANGE UP (ref 12–78)
ANION GAP SERPL CALC-SCNC: 6 MMOL/L — SIGNIFICANT CHANGE UP (ref 5–17)
AST SERPL-CCNC: 23 U/L — SIGNIFICANT CHANGE UP (ref 15–37)
BASOPHILS # BLD AUTO: 0.03 K/UL — SIGNIFICANT CHANGE UP (ref 0–0.2)
BASOPHILS NFR BLD AUTO: 0.4 % — SIGNIFICANT CHANGE UP (ref 0–2)
BILIRUB SERPL-MCNC: 0.9 MG/DL — SIGNIFICANT CHANGE UP (ref 0.2–1.2)
BUN SERPL-MCNC: 13 MG/DL — SIGNIFICANT CHANGE UP (ref 7–23)
CALCIUM SERPL-MCNC: 9 MG/DL — SIGNIFICANT CHANGE UP (ref 8.5–10.1)
CHLORIDE SERPL-SCNC: 109 MMOL/L — HIGH (ref 96–108)
CO2 SERPL-SCNC: 28 MMOL/L — SIGNIFICANT CHANGE UP (ref 22–31)
CREAT SERPL-MCNC: 1.12 MG/DL — SIGNIFICANT CHANGE UP (ref 0.5–1.3)
D DIMER BLD IA.RAPID-MCNC: <150 NG/ML DDU — SIGNIFICANT CHANGE UP
EOSINOPHIL # BLD AUTO: 0.01 K/UL — SIGNIFICANT CHANGE UP (ref 0–0.5)
EOSINOPHIL NFR BLD AUTO: 0.1 % — SIGNIFICANT CHANGE UP (ref 0–6)
GLUCOSE SERPL-MCNC: 108 MG/DL — HIGH (ref 70–99)
HCT VFR BLD CALC: 49.9 % — SIGNIFICANT CHANGE UP (ref 39–50)
HGB BLD-MCNC: 17.3 G/DL — HIGH (ref 13–17)
IMM GRANULOCYTES NFR BLD AUTO: 0.3 % — SIGNIFICANT CHANGE UP (ref 0–1.5)
LYMPHOCYTES # BLD AUTO: 1.15 K/UL — SIGNIFICANT CHANGE UP (ref 1–3.3)
LYMPHOCYTES # BLD AUTO: 16.5 % — SIGNIFICANT CHANGE UP (ref 13–44)
MCHC RBC-ENTMCNC: 32.2 PG — SIGNIFICANT CHANGE UP (ref 27–34)
MCHC RBC-ENTMCNC: 34.7 GM/DL — SIGNIFICANT CHANGE UP (ref 32–36)
MCV RBC AUTO: 92.8 FL — SIGNIFICANT CHANGE UP (ref 80–100)
MONOCYTES # BLD AUTO: 0.5 K/UL — SIGNIFICANT CHANGE UP (ref 0–0.9)
MONOCYTES NFR BLD AUTO: 7.2 % — SIGNIFICANT CHANGE UP (ref 2–14)
NEUTROPHILS # BLD AUTO: 5.26 K/UL — SIGNIFICANT CHANGE UP (ref 1.8–7.4)
NEUTROPHILS NFR BLD AUTO: 75.5 % — SIGNIFICANT CHANGE UP (ref 43–77)
NT-PROBNP SERPL-SCNC: 25 PG/ML — SIGNIFICANT CHANGE UP (ref 0–125)
PLATELET # BLD AUTO: 186 K/UL — SIGNIFICANT CHANGE UP (ref 150–400)
POTASSIUM SERPL-MCNC: 3.8 MMOL/L — SIGNIFICANT CHANGE UP (ref 3.5–5.3)
POTASSIUM SERPL-SCNC: 3.8 MMOL/L — SIGNIFICANT CHANGE UP (ref 3.5–5.3)
PROT SERPL-MCNC: 7.5 GM/DL — SIGNIFICANT CHANGE UP (ref 6–8.3)
RBC # BLD: 5.38 M/UL — SIGNIFICANT CHANGE UP (ref 4.2–5.8)
RBC # FLD: 12 % — SIGNIFICANT CHANGE UP (ref 10.3–14.5)
SODIUM SERPL-SCNC: 143 MMOL/L — SIGNIFICANT CHANGE UP (ref 135–145)
TROPONIN I SERPL-MCNC: <0.015 NG/ML — SIGNIFICANT CHANGE UP (ref 0.01–0.04)
TROPONIN I SERPL-MCNC: <0.015 NG/ML — SIGNIFICANT CHANGE UP (ref 0.01–0.04)
WBC # BLD: 6.97 K/UL — SIGNIFICANT CHANGE UP (ref 3.8–10.5)
WBC # FLD AUTO: 6.97 K/UL — SIGNIFICANT CHANGE UP (ref 3.8–10.5)

## 2021-04-06 PROCEDURE — 93010 ELECTROCARDIOGRAM REPORT: CPT

## 2021-04-06 PROCEDURE — 84484 ASSAY OF TROPONIN QUANT: CPT

## 2021-04-06 PROCEDURE — 36415 COLL VENOUS BLD VENIPUNCTURE: CPT

## 2021-04-06 PROCEDURE — 71275 CT ANGIOGRAPHY CHEST: CPT

## 2021-04-06 PROCEDURE — 80053 COMPREHEN METABOLIC PANEL: CPT

## 2021-04-06 PROCEDURE — 85379 FIBRIN DEGRADATION QUANT: CPT

## 2021-04-06 PROCEDURE — 71275 CT ANGIOGRAPHY CHEST: CPT | Mod: 26

## 2021-04-06 PROCEDURE — 83880 ASSAY OF NATRIURETIC PEPTIDE: CPT

## 2021-04-06 PROCEDURE — 93005 ELECTROCARDIOGRAM TRACING: CPT

## 2021-04-06 PROCEDURE — 85025 COMPLETE CBC W/AUTO DIFF WBC: CPT

## 2021-04-06 PROCEDURE — 99285 EMERGENCY DEPT VISIT HI MDM: CPT

## 2021-04-06 PROCEDURE — 99284 EMERGENCY DEPT VISIT MOD MDM: CPT | Mod: 25

## 2021-04-06 RX ORDER — ASPIRIN/CALCIUM CARB/MAGNESIUM 324 MG
325 TABLET ORAL DAILY
Refills: 0 | Status: DISCONTINUED | OUTPATIENT
Start: 2021-04-06 | End: 2021-04-06

## 2021-04-06 RX ADMIN — Medication 325 MILLIGRAM(S): at 11:00

## 2021-04-06 NOTE — ED STATDOCS - PATIENT PORTAL LINK FT
You can access the FollowMyHealth Patient Portal offered by [facility] by registering at the following website: [email or web address]. By joining [facility]’s FollowMyHealth portal, you will also be able to view your health information using other applications (apps) compatible with our system.

## 2021-04-06 NOTE — ED ADULT NURSE NOTE - OBJECTIVE STATEMENT
pt arrives to ED complaining of shortness of breath starting on saturday. history of HTN. alert and oriented x 4. ambulatory.

## 2021-04-06 NOTE — ED STATDOCS - SKIN, MLM
Continue to offer the breast as often as Femi Or is comfortable doing so paying close attention to positioning for a deeper latch  Refer to the instructional video "Attaching Your Baby at the Breast" on the [address] [place] [address] website for further review  Feed expressed milk as desired  Use paced bottle feeding  This method is less stressful for your baby, prevents overfeeding and protects the breastfeeding relationship  Pump when not feeding at the breast or in addition to feeding to obtain milk for storage  When pumping, Cycle your pump through stimulation and expression mode several times in a session to stimulate several let downs  Use hands on pumping and hand expression to increase your output  Maintain your pump as recommended  To help alleviate your nipple/breast pain, apply warmed ointment and then a warmed breast pad immediately after feeding, pumping or showering  You can also use a heating pad on the lowest setting over your clothing for a few minutes  Speak with [facility] about a consultation with PT/OT/ speech for additional support with [facility]  Breastfeeding difficulties  Follow up as scheduled 
skin normal color for race, warm, dry and intact.

## 2021-04-06 NOTE — ED STATDOCS - ATTENDING CONTRIBUTION TO CARE
I, Mark Olguin MD,  performed the initial face to face bedside interview with this patient regarding history of present illness, review of symptoms and relevant past medical, social and family history.  I completed an independent physical examination.  I was the initial provider who evaluated this patient. I have signed out the follow up of any pending tests (i.e. labs, radiological studies) to the ACP.  I have communicated the patient’s plan of care and disposition with the ACP.  The history, relevant review of systems, past medical and surgical history, medical decision making, and physical examination was documented by the scribe in my presence and I attest to the accuracy of the documentation.

## 2021-04-06 NOTE — ED STATDOCS - CLINICAL SUMMARY MEDICAL DECISION MAKING FREE TEXT BOX
Pt presents with episode of difficulty breathing last night, chest pressure, check labs, pt had outpatient CXR and covid test

## 2021-04-06 NOTE — ED ADULT TRIAGE NOTE - CHIEF COMPLAINT QUOTE
Pt presents to the Ed with c/o sob since last Saturday. tested   negative for Covid last Saturday. Denies any chest pain

## 2021-04-06 NOTE — ED STATDOCS - NSFOLLOWUPINSTRUCTIONS_ED_ALL_ED_FT
PLEASE FOLLOW UP WITH CARDIOLOGY AND PULMONOLOGY AS ADVISED. RETURN TO ED IF SYMPTOMS WORSEN.     Shortness of Breath    WHAT YOU NEED TO KNOW:    Shortness of breath is a feeling that you cannot get enough air when you breathe in. You may have this feeling only during activity, or all the time. Your symptoms can range from mild to severe. Shortness of breath may be a sign of a serious health condition that needs immediate care.    DISCHARGE INSTRUCTIONS:    Return to the emergency department if:     Your signs and symptoms are the same or worse within 24 hours of treatment.       The skin over your ribs or on your neck sinks in when you breathe.       You feel confused or dizzy.    Contact your healthcare provider if:     You have new or worsening symptoms.      You have questions or concerns about your condition or care.    Medicines:     Medicines may be used to treat the cause of your symptoms. You may need medicine to treat a bacterial infection or reduce anxiety. Other medicines may be used to open your airway, reduce swelling, or remove extra fluid. If you have a heart condition, you may need medicine to help your heart beat more strongly or regularly.      Take your medicine as directed. Contact your healthcare provider if you think your medicine is not helping or if you have side effects. Tell him or her if you are allergic to any medicine. Keep a list of the medicines, vitamins, and herbs you take. Include the amounts, and when and why you take them. Bring the list or the pill bottles to follow-up visits. Carry your medicine list with you in case of an emergency.    Manage shortness of breath:     Create an action plan. You and your healthcare provider can work together to create a plan for how to handle shortness of breath. The plan can include daily activities, treatment changes, and what to do if you have severe breathing problems.      Lean forward on your elbows when you sit. This helps your lungs expand and may make it easier to breathe.      Use pursed-lip breathing any time you feel short of breath. Breathe in through your nose and then slowly breathe out through your mouth with your lips slightly puckered. It should take you twice as long to breathe out as it did to breathe in.Breathe in Breathe out           Do not smoke. Nicotine and other chemicals in cigarettes and cigars can cause lung damage and make shortness of breath worse. Ask your healthcare provider for information if you currently smoke and need help to quit. E-cigarettes or smokeless tobacco still contain nicotine. Talk to your healthcare provider before you use these products.      Reach or maintain a healthy weight. Your healthcare provider can help you create a safe weight loss plan if you are overweight.      Exercise as directed. Exercise can help your lungs work more easily. Exercise can also help you lose weight if needed. Try to get at least 30 minutes of exercise most days of the week.    Follow up with your healthcare provider or specialist as directed: Write down your questions so you remember to ask them during your visits.

## 2021-04-06 NOTE — ED STATDOCS - PROGRESS NOTE DETAILS
59 y/o M with PMH of HTN, JULISSA, known RBBB presents with SOB worsening over past 4-7 days. Pt went to urgent care on 4/3/21. Had XR, EKG and covid test. No findings on CXR, COVID resulted negative. EKG consistent with prior. Pt states he's seen his pulmonologist yesterday with CXR and COVID repeated again with no acute findings. notes he's been using CPAP machine, last night woke up "breathing heavy" with associated chest tightness. Notes the tightness has since improved. 59 y/o M with PMH of HTN, JULISSA, known RBBB presents with SOB worsening over past 4-7 days. Pt went to urgent care on 4/3/21. Had XR, EKG and covid test. No findings on CXR, COVID resulted negative. EKG consistent with prior. Pt states he's seen his pulmonologist yesterday with CXR and COVID repeated again with no acute findings. notes he's been using CPAP machine, last night woke up "breathing heavy" with associated chest tightness. Notes the tightness has since improved. Nonsmoker. No FMH of cardiac dx. PE: Well appearing. Obese. Cardiac: s1s2, RRR. Lungs: CTAB. Abdomen: NBS x4, soft, nontender. PV: No LE edema, no calf tenderness. A/P: Chest tightness. r/o ACS, PE, CHF. Plan for Labs, EKG, CTA chest, reassess. - Derrick Cardoso PA-C Labs and imaging reviewed with patient. No acute findings including troponin x2 negative. No evidence for PE on CTA. Will dc at this time with recommended pulmonary and cardiac follow up. - Derrick Cardoso PA-C

## 2021-04-06 NOTE — ED STATDOCS - OBJECTIVE STATEMENT
61 y/o male with a PMHx of HTN, Costochondritis, Sleep apnea, RBBB, presents to the ED c/o shortness of breath x 4 days States he has difficulty catching his breath and has been gradually worsening. Pt was seen at Urgent Care on 04/03 and had a negative COVID test. F/u with pulmonologist MD Sky yesterday and had another negative COVID test and had a normal CXR. States he uses a cpap machine at night but last night had difficulty breathing and chest tightness while using cpap. States chest tightness has improved. Called cardiologist to make an appointment but was sent to the ED due to sx. Denies fever. Non smoker.   Cardio: Dr. Goins

## 2021-04-06 NOTE — ED STATDOCS - CARE PROVIDER_API CALL
Kyle Whitlock)  Cardiovascular Disease  [address], [address]  [place] [address]  Phone: [phone]  Fax: [phone]  Follow Up Time:     Sean Huitron)  Cardiovascular Disease; Internal Medicine  [address] [place] [address]  Phone: [phone]  Fax: [phone]  Follow Up Time:

## 2021-04-16 ENCOUNTER — NON-APPOINTMENT (OUTPATIENT)
Age: 61
End: 2021-04-16

## 2021-04-16 ENCOUNTER — APPOINTMENT (OUTPATIENT)
Dept: CARDIOLOGY | Facility: CLINIC | Age: 61
End: 2021-04-16
Payer: COMMERCIAL

## 2021-04-16 VITALS
DIASTOLIC BLOOD PRESSURE: 84 MMHG | SYSTOLIC BLOOD PRESSURE: 145 MMHG | WEIGHT: 271 LBS | OXYGEN SATURATION: 98 % | TEMPERATURE: 98.7 F | HEIGHT: 67 IN | HEART RATE: 68 BPM | BODY MASS INDEX: 42.53 KG/M2

## 2021-04-16 PROCEDURE — 99205 OFFICE O/P NEW HI 60 MIN: CPT

## 2021-04-16 PROCEDURE — 93000 ELECTROCARDIOGRAM COMPLETE: CPT | Mod: GC

## 2021-04-16 PROCEDURE — 99072 ADDL SUPL MATRL&STAF TM PHE: CPT

## 2021-04-16 RX ORDER — METHYLPREDNISOLONE 4 MG/1
4 TABLET ORAL
Qty: 1 | Refills: 0 | Status: DISCONTINUED | COMMUNITY
Start: 2019-08-22 | End: 2021-04-16

## 2021-04-20 NOTE — ASSESSMENT
[FreeTextEntry1] : A/P:\par \par *Dyspnea\par -baseline labs: CBC, comp panel in ED unremarkable\par \par -Echo\par -Nuclear lexiscan stress test - pt unable to achieve exercise capacity due to\par back pain (?) & reported dyspnea.\par -ziopatch\par \par \par Return in 2 weeks to review prelim data

## 2021-04-20 NOTE — HISTORY OF PRESENT ILLNESS
[FreeTextEntry1] : HAYDEE BALLESTEROS (HAYDEE) \par 1960(60y)M\par \par referred for dyspnea\par \par 61 y/o w/  Obesity BMI 45, JULISSA on CPAP\par \par Over last 5 months gradually worsening dyspnea.\par No orthopnea, PND but this improved w/ CPAP, no LE edema,\par Weight loss - 18 pds in past 2 weeks, no ab swelling.\par reports tightness not related to exertion, no relation to eating,\par position, palpation.\par no palpitations.\par no regular exercise.\par \par no medications.\par knee surgery, cath, deviated septum\par rare ETOH, no smoking, no illicits\par works in construction but sedentary\par walks 4 city blocks, & 2 flights of stairs w/o problems.\par \par not using CPAP until ED visit.\par \par Dad -asthma diagnosed at age 50\par \par \par cath by fortunato '19 for chest pain showed nonobstructive disease "10% prox LAD").\par EF 60%, LVEDP 34\par CT scan April '21 neg for PE normal lungs\par April '21 Hb 17 Cr 1.12 trops neg BNP 25 LDL 89

## 2021-04-22 ENCOUNTER — APPOINTMENT (OUTPATIENT)
Dept: CARDIOLOGY | Facility: CLINIC | Age: 61
End: 2021-04-22

## 2021-04-28 ENCOUNTER — APPOINTMENT (OUTPATIENT)
Dept: CARDIOLOGY | Facility: CLINIC | Age: 61
End: 2021-04-28
Payer: COMMERCIAL

## 2021-04-28 PROCEDURE — 99072 ADDL SUPL MATRL&STAF TM PHE: CPT

## 2021-04-28 PROCEDURE — 93306 TTE W/DOPPLER COMPLETE: CPT

## 2021-05-14 ENCOUNTER — NON-APPOINTMENT (OUTPATIENT)
Age: 61
End: 2021-05-14

## 2021-05-14 ENCOUNTER — EMERGENCY (EMERGENCY)
Facility: HOSPITAL | Age: 61
LOS: 0 days | Discharge: HOME | End: 2021-05-14
Attending: STUDENT IN AN ORGANIZED HEALTH CARE EDUCATION/TRAINING PROGRAM
Payer: COMMERCIAL

## 2021-05-14 ENCOUNTER — APPOINTMENT (OUTPATIENT)
Dept: CARDIOLOGY | Facility: CLINIC | Age: 61
End: 2021-05-14
Payer: COMMERCIAL

## 2021-05-14 VITALS — SYSTOLIC BLOOD PRESSURE: 135 MMHG | OXYGEN SATURATION: 97 % | DIASTOLIC BLOOD PRESSURE: 84 MMHG | HEART RATE: 59 BPM

## 2021-05-14 VITALS
SYSTOLIC BLOOD PRESSURE: 150 MMHG | DIASTOLIC BLOOD PRESSURE: 82 MMHG | HEART RATE: 65 BPM | WEIGHT: 255 LBS | HEIGHT: 67 IN | BODY MASS INDEX: 40.02 KG/M2 | OXYGEN SATURATION: 95 %

## 2021-05-14 VITALS — HEIGHT: 68 IN | WEIGHT: 255.07 LBS

## 2021-05-14 DIAGNOSIS — R00.1 BRADYCARDIA, UNSPECIFIED: ICD-10-CM

## 2021-05-14 DIAGNOSIS — R07.89 OTHER CHEST PAIN: ICD-10-CM

## 2021-05-14 DIAGNOSIS — J34.2 DEVIATED NASAL SEPTUM: Chronic | ICD-10-CM

## 2021-05-14 DIAGNOSIS — R06.02 SHORTNESS OF BREATH: ICD-10-CM

## 2021-05-14 DIAGNOSIS — S83.512S SPRAIN OF ANTERIOR CRUCIATE LIGAMENT OF LEFT KNEE, SEQUELA: Chronic | ICD-10-CM

## 2021-05-14 DIAGNOSIS — Z20.822 CONTACT WITH AND (SUSPECTED) EXPOSURE TO COVID-19: ICD-10-CM

## 2021-05-14 DIAGNOSIS — Z88.5 ALLERGY STATUS TO NARCOTIC AGENT: ICD-10-CM

## 2021-05-14 DIAGNOSIS — R06.09 OTHER FORMS OF DYSPNEA: ICD-10-CM

## 2021-05-14 DIAGNOSIS — I10 ESSENTIAL (PRIMARY) HYPERTENSION: ICD-10-CM

## 2021-05-14 LAB
ALBUMIN SERPL ELPH-MCNC: 4.4 G/DL — SIGNIFICANT CHANGE UP (ref 3.3–5)
ALP SERPL-CCNC: 84 U/L — SIGNIFICANT CHANGE UP (ref 40–120)
ALT FLD-CCNC: 49 U/L — SIGNIFICANT CHANGE UP (ref 12–78)
ANION GAP SERPL CALC-SCNC: 6 MMOL/L — SIGNIFICANT CHANGE UP (ref 5–17)
AST SERPL-CCNC: 20 U/L — SIGNIFICANT CHANGE UP (ref 15–37)
BASE EXCESS BLDA CALC-SCNC: 2.2 MMOL/L — HIGH (ref -2–2)
BASOPHILS # BLD AUTO: 0.03 K/UL — SIGNIFICANT CHANGE UP (ref 0–0.2)
BASOPHILS NFR BLD AUTO: 0.5 % — SIGNIFICANT CHANGE UP (ref 0–2)
BILIRUB SERPL-MCNC: 1.1 MG/DL — SIGNIFICANT CHANGE UP (ref 0.2–1.2)
BLOOD GAS COMMENTS ARTERIAL: SIGNIFICANT CHANGE UP
BUN SERPL-MCNC: 11 MG/DL — SIGNIFICANT CHANGE UP (ref 7–23)
CALCIUM SERPL-MCNC: 9.9 MG/DL — SIGNIFICANT CHANGE UP (ref 8.5–10.1)
CHLORIDE SERPL-SCNC: 108 MMOL/L — SIGNIFICANT CHANGE UP (ref 96–108)
CO2 SERPL-SCNC: 29 MMOL/L — SIGNIFICANT CHANGE UP (ref 22–31)
CREAT SERPL-MCNC: 0.92 MG/DL — SIGNIFICANT CHANGE UP (ref 0.5–1.3)
D DIMER BLD IA.RAPID-MCNC: <150 NG/ML DDU — SIGNIFICANT CHANGE UP
EOSINOPHIL # BLD AUTO: 0.04 K/UL — SIGNIFICANT CHANGE UP (ref 0–0.5)
EOSINOPHIL NFR BLD AUTO: 0.7 % — SIGNIFICANT CHANGE UP (ref 0–6)
GLUCOSE SERPL-MCNC: 87 MG/DL — SIGNIFICANT CHANGE UP (ref 70–99)
HCO3 BLDA-SCNC: 24 MMOL/L — SIGNIFICANT CHANGE UP (ref 21–29)
HCT VFR BLD CALC: 49.8 % — SIGNIFICANT CHANGE UP (ref 39–50)
HGB BLD-MCNC: 17 G/DL — SIGNIFICANT CHANGE UP (ref 13–17)
IMM GRANULOCYTES NFR BLD AUTO: 0.2 % — SIGNIFICANT CHANGE UP (ref 0–1.5)
LYMPHOCYTES # BLD AUTO: 1.14 K/UL — SIGNIFICANT CHANGE UP (ref 1–3.3)
LYMPHOCYTES # BLD AUTO: 18.7 % — SIGNIFICANT CHANGE UP (ref 13–44)
MCHC RBC-ENTMCNC: 32.1 PG — SIGNIFICANT CHANGE UP (ref 27–34)
MCHC RBC-ENTMCNC: 34.1 GM/DL — SIGNIFICANT CHANGE UP (ref 32–36)
MCV RBC AUTO: 94 FL — SIGNIFICANT CHANGE UP (ref 80–100)
MONOCYTES # BLD AUTO: 0.57 K/UL — SIGNIFICANT CHANGE UP (ref 0–0.9)
MONOCYTES NFR BLD AUTO: 9.3 % — SIGNIFICANT CHANGE UP (ref 2–14)
NEUTROPHILS # BLD AUTO: 4.31 K/UL — SIGNIFICANT CHANGE UP (ref 1.8–7.4)
NEUTROPHILS NFR BLD AUTO: 70.6 % — SIGNIFICANT CHANGE UP (ref 43–77)
NT-PROBNP SERPL-SCNC: 34 PG/ML — SIGNIFICANT CHANGE UP (ref 0–125)
PCO2 BLDA: 32 MMHG — SIGNIFICANT CHANGE UP (ref 32–46)
PH BLDA: 7.49 — HIGH (ref 7.35–7.45)
PLATELET # BLD AUTO: 187 K/UL — SIGNIFICANT CHANGE UP (ref 150–400)
PO2 BLDA: 117 MMHG — HIGH (ref 74–108)
POTASSIUM SERPL-MCNC: 3.7 MMOL/L — SIGNIFICANT CHANGE UP (ref 3.5–5.3)
POTASSIUM SERPL-SCNC: 3.7 MMOL/L — SIGNIFICANT CHANGE UP (ref 3.5–5.3)
PROT SERPL-MCNC: 7.5 GM/DL — SIGNIFICANT CHANGE UP (ref 6–8.3)
RBC # BLD: 5.3 M/UL — SIGNIFICANT CHANGE UP (ref 4.2–5.8)
RBC # FLD: 12.1 % — SIGNIFICANT CHANGE UP (ref 10.3–14.5)
SAO2 % BLDA: 99 % — HIGH (ref 92–96)
SARS-COV-2 RNA SPEC QL NAA+PROBE: SIGNIFICANT CHANGE UP
SODIUM SERPL-SCNC: 143 MMOL/L — SIGNIFICANT CHANGE UP (ref 135–145)
TROPONIN I SERPL-MCNC: <0.015 NG/ML — SIGNIFICANT CHANGE UP (ref 0.01–0.04)
TROPONIN I SERPL-MCNC: <0.015 NG/ML — SIGNIFICANT CHANGE UP (ref 0.01–0.04)
WBC # BLD: 6.1 K/UL — SIGNIFICANT CHANGE UP (ref 3.8–10.5)
WBC # FLD AUTO: 6.1 K/UL — SIGNIFICANT CHANGE UP (ref 3.8–10.5)

## 2021-05-14 PROCEDURE — 71046 X-RAY EXAM CHEST 2 VIEWS: CPT

## 2021-05-14 PROCEDURE — 99283 EMERGENCY DEPT VISIT LOW MDM: CPT | Mod: 25

## 2021-05-14 PROCEDURE — 93005 ELECTROCARDIOGRAM TRACING: CPT

## 2021-05-14 PROCEDURE — 36600 WITHDRAWAL OF ARTERIAL BLOOD: CPT

## 2021-05-14 PROCEDURE — 80053 COMPREHEN METABOLIC PANEL: CPT

## 2021-05-14 PROCEDURE — 99285 EMERGENCY DEPT VISIT HI MDM: CPT

## 2021-05-14 PROCEDURE — 94640 AIRWAY INHALATION TREATMENT: CPT

## 2021-05-14 PROCEDURE — U0005: CPT

## 2021-05-14 PROCEDURE — 99072 ADDL SUPL MATRL&STAF TM PHE: CPT

## 2021-05-14 PROCEDURE — 99215 OFFICE O/P EST HI 40 MIN: CPT

## 2021-05-14 PROCEDURE — 83880 ASSAY OF NATRIURETIC PEPTIDE: CPT

## 2021-05-14 PROCEDURE — 84484 ASSAY OF TROPONIN QUANT: CPT

## 2021-05-14 PROCEDURE — 85025 COMPLETE CBC W/AUTO DIFF WBC: CPT

## 2021-05-14 PROCEDURE — 82803 BLOOD GASES ANY COMBINATION: CPT

## 2021-05-14 PROCEDURE — 93000 ELECTROCARDIOGRAM COMPLETE: CPT | Mod: 59

## 2021-05-14 PROCEDURE — 85379 FIBRIN DEGRADATION QUANT: CPT

## 2021-05-14 PROCEDURE — 36415 COLL VENOUS BLD VENIPUNCTURE: CPT

## 2021-05-14 PROCEDURE — U0003: CPT

## 2021-05-14 PROCEDURE — 71046 X-RAY EXAM CHEST 2 VIEWS: CPT | Mod: 26

## 2021-05-14 PROCEDURE — 93010 ELECTROCARDIOGRAM REPORT: CPT

## 2021-05-14 RX ORDER — TIOTROPIUM BROMIDE 18 UG/1
1 CAPSULE ORAL; RESPIRATORY (INHALATION) ONCE
Refills: 0 | Status: COMPLETED | OUTPATIENT
Start: 2021-05-14 | End: 2021-05-14

## 2021-05-14 RX ORDER — ALBUTEROL 90 UG/1
2 AEROSOL, METERED ORAL ONCE
Refills: 0 | Status: COMPLETED | OUTPATIENT
Start: 2021-05-14 | End: 2021-05-14

## 2021-05-14 RX ADMIN — TIOTROPIUM BROMIDE 1 CAPSULE(S): 18 CAPSULE ORAL; RESPIRATORY (INHALATION) at 15:18

## 2021-05-14 RX ADMIN — ALBUTEROL 2 PUFF(S): 90 AEROSOL, METERED ORAL at 15:18

## 2021-05-14 NOTE — ED STATDOCS - NS ED ROS FT
Constitutional: No fever.  Neurological: No headache.  Eyes: No vision changes.   Ears, Nose, Mouth, Throat: No congestion.  Cardiovascular: +chest tightness  Respiratory: +SOB, +orthopnea.   Gastrointestinal: No nausea or vomiting.  Genitourinary: No dysuria.  Musculoskeletal: No joint pain.  Integumentary (skin and/or breast): No rash.

## 2021-05-14 NOTE — ED STATDOCS - OBJECTIVE STATEMENT
62 y/o male with a PMHx of costochondritis, HTN, sleep apnea presents to the ED sent by Dr. Pérez for SOB since last month, worsening today. +orthopnea, +chest tightness. Denies cough. Pt had echo with normal EF. Pt had a cardiac monitor with no arrhythmia. No other complaints at this time. Pulmonologist: Dr. Sky. 62 y/o male with a PMHx of costochondritis, HTN, sleep apnea presents to the ED sent by Dr. Pérez for SOB since last month, worsening today. +orthopnea, +chest tightness. Denies cough. Pt had echo with normal EF. Pt had a cardiac monitor with no arrhythmia. Nonsmoker. No other complaints at this time. Pulmonologist: Dr. Sky.

## 2021-05-14 NOTE — ED STATDOCS - CARE PROVIDER_API CALL
Goran Ramirez)  Cardiology  [address] [place] [address]  Phone: [phone]  Fax: [phone]  Follow Up Time:

## 2021-05-14 NOTE — ED STATDOCS - CLINICAL SUMMARY MEDICAL DECISION MAKING FREE TEXT BOX
Sent by Dr. Pérez for evaluation of long standing SOB. Will r/o ACS, PE, give trial of COPD inhalers to see if there is symptomatic improvement, consult Dr. Pérez.

## 2021-05-14 NOTE — ED ADULT TRIAGE NOTE - CHIEF COMPLAINT QUOTE
Pt sent in by  for SOB and cardiac workup, ruling out cardiac abnormality, pt denies chest pain, palpitations at this time.

## 2021-05-14 NOTE — ED ADULT NURSE NOTE - OBJECTIVE STATEMENT
Pt alert and oriented x4, ambulatory in ED with no distress noted c/o shortness of breath upon exertion. Pt states he has a loop recorder in place to monitor his heart rate. Pt states he becomes short of breath upon extended periods of walking around. PT denies chest pain, dizziness, vomiting, diarrhea. Cardiac monitoring in progress

## 2021-05-14 NOTE — ED STATDOCS - NSFOLLOWUPINSTRUCTIONS_ED_ALL_ED_FT
FOLLOW UP WITH PULMONOLOGY AS ADVISED. EXPECT CALL TO CONFIRM APPOINTMENT TOMORROW. FOLLOW UP WITH DR. LANDON IN 2 WEEKS.    Shortness of Breath    WHAT YOU NEED TO KNOW:    Shortness of breath is a feeling that you cannot get enough air when you breathe in. You may have this feeling only during activity, or all the time. Your symptoms can range from mild to severe. Shortness of breath may be a sign of a serious health condition that needs immediate care.    DISCHARGE INSTRUCTIONS:    Return to the emergency department if:     Your signs and symptoms are the same or worse within 24 hours of treatment.       The skin over your ribs or on your neck sinks in when you breathe.       You feel confused or dizzy.    Contact your healthcare provider if:     You have new or worsening symptoms.      You have questions or concerns about your condition or care.    Medicines:     Medicines may be used to treat the cause of your symptoms. You may need medicine to treat a bacterial infection or reduce anxiety. Other medicines may be used to open your airway, reduce swelling, or remove extra fluid. If you have a heart condition, you may need medicine to help your heart beat more strongly or regularly.      Take your medicine as directed. Contact your healthcare provider if you think your medicine is not helping or if you have side effects. Tell him or her if you are allergic to any medicine. Keep a list of the medicines, vitamins, and herbs you take. Include the amounts, and when and why you take them. Bring the list or the pill bottles to follow-up visits. Carry your medicine list with you in case of an emergency.    Manage shortness of breath:     Create an action plan. You and your healthcare provider can work together to create a plan for how to handle shortness of breath. The plan can include daily activities, treatment changes, and what to do if you have severe breathing problems.      Lean forward on your elbows when you sit. This helps your lungs expand and may make it easier to breathe.      Use pursed-lip breathing any time you feel short of breath. Breathe in through your nose and then slowly breathe out through your mouth with your lips slightly puckered. It should take you twice as long to breathe out as it did to breathe in.Breathe in Breathe out           Do not smoke. Nicotine and other chemicals in cigarettes and cigars can cause lung damage and make shortness of breath worse. Ask your healthcare provider for information if you currently smoke and need help to quit. E-cigarettes or smokeless tobacco still contain nicotine. Talk to your healthcare provider before you use these products.      Reach or maintain a healthy weight. Your healthcare provider can help you create a safe weight loss plan if you are overweight.      Exercise as directed. Exercise can help your lungs work more easily. Exercise can also help you lose weight if needed. Try to get at least 30 minutes of exercise most days of the week.    Follow up with your healthcare provider or specialist as directed: Write down your questions so you remember to ask them during your visits.

## 2021-05-14 NOTE — ED STATDOCS - PATIENT PORTAL LINK FT
You can access the FollowMyHealth Patient Portal offered by [facility] by registering at the following website: [email or web address]. By joining [employer]’s FollowMyHealth portal, you will also be able to view your health information using other applications (apps) compatible with our system.

## 2021-05-14 NOTE — ED STATDOCS - ATTENDING CONTRIBUTION TO CARE
I, Max Munoz DO, personally saw the patient with ACP.  I have personally performed a face to face diagnostic evaluation on this patient and formulated the patient plan. The case was discussed with, and handed off to ACP who followed the case through to the re-evaluation and disposition.

## 2021-05-14 NOTE — ED STATDOCS - PHYSICAL EXAMINATION
Vital signs as available reviewed.  General:  Comfortable, no acute distress.  Head:  Normocephalic, atraumatic.  Eyes:  Conjunctiva pink, no icterus.  Cardiovascular:  Regular rate, no obvious murmur.  Respiratory:  Clear to auscultation, good air entry bilaterally.  Abdomen:  Soft, non-tender.  Musculoskeletal:  No deformity or calf tenderness.  Neurologic: Alert and oriented, moving all extremities.  Skin:  Warm and dry. Vital signs as available reviewed.  General:  Obese, comfortable, no acute distress.  Head:  Normocephalic, atraumatic.  Eyes:  Conjunctiva pink, no icterus.  Cardiovascular:  Regular rate, no obvious murmur.  Respiratory:  Clear to auscultation, good air entry bilaterally.  Abdomen:  Soft, non-tender.  Musculoskeletal:  No deformity or calf tenderness.  Neurologic: Alert and oriented, moving all extremities.  Skin:  Warm and dry.

## 2021-05-14 NOTE — ED STATDOCS - PROGRESS NOTE DETAILS
62 y/o M with PMH of HTN, JULISSA sent in by Dr. Ramirez for SOB x 1 month. Pt was in ED apx 5 weeks ago for similar. Had negative evaluation including troponin x2 and CTA chest which showed no sign of PE. Had recent outpatient echocardiogram which was unremarkable. Recent Holtor monitor also unremarkable. Describes sensation as "I feel like I can't take a deep breath." Non-smoker. PE: Well appearing. Cardiac: s1s2, RRR. Luns: CTAB. Abdomen: NBS x4, soft, nontender. PV: No LE edema, calf tenderness. A/P: Low suspicion for PE, r/o ACS. ?COPD. Plan for labs, EKG, CXR, symptomatic care. Reassess. - Derrick Cardoso PA-C Attempted to call Dr. Ramirez. Will call back when available. - Derrick Cardoso PA-C D/w Dr. Ramirez. Labs and CXR reviewed, no acute findings with 2nd troponin pending. Agreeable to 2nd troponin. Recommended close pulmonology follow up and follow up in his office in 2 weeks. Will attempt outpatient NM stress test. - Derrick Cardoso PA-C Patient made aware of results and recommendations with troponin pending. Pt agreeable to plan for likely outpatient pulmonary and cardiology follow up pending result. - Derrick Cardoso PA-C

## 2021-05-14 NOTE — ED STATDOCS - PROGRESS NOTE
"Anesthesia Release from PACU Note    Patient: Jake Ortiz    Procedure(s) Performed: Procedure(s) (LRB):  EGD (ESOPHAGOGASTRODUODENOSCOPY) (N/A)  COLONOSCOPY (N/A)    Anesthesia type: MAC    Post pain: Adequate analgesia    Post assessment: no apparent anesthetic complications and tolerated procedure well    Last Vitals:   Visit Vitals  /86 (BP Location: Left arm, Patient Position: Lying)   Pulse (!) 58   Temp 36.5 °C (97.7 °F) (Temporal)   Resp 17   Ht 5' 9" (1.753 m)   Wt 94.8 kg (208 lb 15.9 oz)   SpO2 100%   BMI 30.86 kg/m²       Post vital signs: stable    Level of consciousness: awake, alert  and oriented    Nausea/Vomiting: no nausea/no vomiting    Complications: none    Airway Patency: patent    Respiratory: unassisted, spontaneous ventilation, room air    Cardiovascular: stable and blood pressure at baseline    Hydration: euvolemic  " Stable.

## 2021-05-21 ENCOUNTER — NON-APPOINTMENT (OUTPATIENT)
Age: 61
End: 2021-05-21

## 2021-05-21 ENCOUNTER — APPOINTMENT (OUTPATIENT)
Dept: INTERNAL MEDICINE | Facility: CLINIC | Age: 61
End: 2021-05-21
Payer: COMMERCIAL

## 2021-05-21 VITALS
TEMPERATURE: 97.2 F | HEART RATE: 59 BPM | HEIGHT: 67 IN | BODY MASS INDEX: 39.55 KG/M2 | RESPIRATION RATE: 18 BRPM | DIASTOLIC BLOOD PRESSURE: 82 MMHG | WEIGHT: 252 LBS | OXYGEN SATURATION: 97 % | SYSTOLIC BLOOD PRESSURE: 138 MMHG

## 2021-05-21 DIAGNOSIS — Z20.822 CONTACT WITH AND (SUSPECTED) EXPOSURE TO COVID-19: ICD-10-CM

## 2021-05-21 PROCEDURE — G0447 BEHAVIOR COUNSEL OBESITY 15M: CPT

## 2021-05-21 PROCEDURE — 99072 ADDL SUPL MATRL&STAF TM PHE: CPT

## 2021-05-21 PROCEDURE — 99204 OFFICE O/P NEW MOD 45 MIN: CPT | Mod: 25

## 2021-05-26 DIAGNOSIS — Z01.812 ENCOUNTER FOR PREPROCEDURAL LABORATORY EXAMINATION: ICD-10-CM

## 2021-05-26 DIAGNOSIS — Z20.822 ENCOUNTER FOR PREPROCEDURAL LABORATORY EXAMINATION: ICD-10-CM

## 2021-05-29 ENCOUNTER — TRANSCRIPTION ENCOUNTER (OUTPATIENT)
Age: 61
End: 2021-05-29

## 2021-06-02 ENCOUNTER — APPOINTMENT (OUTPATIENT)
Dept: INTERNAL MEDICINE | Facility: CLINIC | Age: 61
End: 2021-06-02
Payer: COMMERCIAL

## 2021-06-02 ENCOUNTER — APPOINTMENT (OUTPATIENT)
Dept: CARDIOLOGY | Facility: CLINIC | Age: 61
End: 2021-06-02
Payer: COMMERCIAL

## 2021-06-02 VITALS
TEMPERATURE: 97.9 F | OXYGEN SATURATION: 98 % | SYSTOLIC BLOOD PRESSURE: 140 MMHG | WEIGHT: 250 LBS | RESPIRATION RATE: 18 BRPM | HEIGHT: 67 IN | BODY MASS INDEX: 39.24 KG/M2 | HEART RATE: 70 BPM | DIASTOLIC BLOOD PRESSURE: 82 MMHG

## 2021-06-02 DIAGNOSIS — E66.9 OBESITY, UNSPECIFIED: ICD-10-CM

## 2021-06-02 DIAGNOSIS — G47.33 OBSTRUCTIVE SLEEP APNEA (ADULT) (PEDIATRIC): ICD-10-CM

## 2021-06-02 DIAGNOSIS — R06.02 SHORTNESS OF BREATH: ICD-10-CM

## 2021-06-02 PROCEDURE — 94729 DIFFUSING CAPACITY: CPT

## 2021-06-02 PROCEDURE — 94727 GAS DIL/WSHOT DETER LNG VOL: CPT

## 2021-06-02 PROCEDURE — 93015 CV STRESS TEST SUPVJ I&R: CPT

## 2021-06-02 PROCEDURE — 99072 ADDL SUPL MATRL&STAF TM PHE: CPT

## 2021-06-02 PROCEDURE — 94010 BREATHING CAPACITY TEST: CPT

## 2021-06-02 NOTE — ASSESSMENT
[FreeTextEntry1] : A/P:\par *dyspnea\par -Echo & event monitor unremarkable\par -pt reports dyspnea in exam room though\par objective measurements unremarkable\par -reports difficulty w/ ADLs\par \par advised him to seek evaluation in ED\par Discussed w/ ED, recommend COVID testing,\par ABG, pulmonary evaluation.\par \par Cannot rule out anginal equivalent\par but with neg cath in '19 & history\par this seems unlikely.\par \par Return to clinic in 2 weeks.\par =============\par \par Addendum:\par \par Reviewd MCOT monitor\par \par 4/26-5/25: NSR, low PVC burden 1%. reported symptoms almost daily.\par dyspnea lightheadness, chest pain.sinus bradycardia no significant arrhythmias.

## 2021-06-02 NOTE — HISTORY OF PRESENT ILLNESS
[FreeTextEntry1] : \par HAYDEE BALLESTEROS (HAYDEE) \par 1960(60y)M\par \par referred for dyspnea\par \par 61 y/o w/ Obesity BMI 45, JULISSA on CPAP\par \par here for followup.\par \par Since last visit, echo & event monitoring performed.\par Echo w/ normal EF.  Event monitoring showed no\par arrhythmias or ischemic changes during multiple times\par pt activated button for symptoms.\par \par Pt reports marked dyspnea today in exam room.\par Pulse ox on RA normal, ECG shows normal sinus rhythm.\par Pt states he has significant dyspnea specifically difficulty taking\par deep breath in.\par Did not get stress test b/c of concerns re: reaction to chemical\par stress agent (last stress 12 years ago likely used adenosine)\par & b/c has difficulty lying flat.\par \par had labs at PCPs office yesterday, unable to review.\par \par cath by fortunato '19 for chest pain showed nonobstructive disease "10% prox LAD").\par EF 60%, LVEDP 34\par CT scan April '21 neg for PE normal lungs\par April '21 Hb 17 Cr 1.12 trops neg BNP 25 LDL 89 \par

## 2021-06-03 PROBLEM — R06.02 SHORTNESS OF BREATH AT REST: Status: ACTIVE | Noted: 2021-05-21

## 2021-06-03 PROBLEM — E66.9 OBESITY (BMI 35.0-39.9 WITHOUT COMORBIDITY): Status: ACTIVE | Noted: 2021-05-21

## 2021-06-07 NOTE — PHYSICAL EXAM
[No Acute Distress] : no acute distress [Normal Oropharynx] : normal oropharynx [Normal Appearance] : normal appearance [No Neck Mass] : no neck mass [Normal Rate/Rhythm] : normal rate/rhythm [Normal S1, S2] : normal s1, s2 [No Murmurs] : no murmurs [No Resp Distress] : no resp distress [Clear to Auscultation Bilaterally] : clear to auscultation bilaterally [No Abnormalities] : no abnormalities [Benign] : benign [Normal Gait] : normal gait [No Clubbing] : no clubbing [No Cyanosis] : no cyanosis [No Edema] : no edema [FROM] : FROM [Normal Color/ Pigmentation] : normal color/ pigmentation [No Focal Deficits] : no focal deficits [Oriented x3] : oriented x3 [Normal Affect] : normal affect [TextBox_68] : FET less than 2 sec.

## 2021-06-07 NOTE — HISTORY OF PRESENT ILLNESS
[TextBox_4] : Initial pulmonary appointment here for this 61-year-old male with a history of obesity, obstructive sleep apnea.  He has a sensation at rest that he occasionally cannot get enough breath in.  This will occasionally be associated with lightheadedness.  No noted tingling or numbness associated.  He is able to exert himself, walk 1 mile without any shortness of breath.  He is not having coughing, wheezing, sputum function, or hemoptysis.  He is a non-smoker.  He has no history of asthma or COPD.  Does have a history of allergic rhinitis and pollen allergy, and sinus congestion.\par \par In the emergency room in April he had a CTA chest that was negative for PE and showed normal lungs.  Chest x-ray on 5/14/2021 was within normal limits.\par \par Does have a past history of costochondritis.\par \par He has a history of obstructive sleep apnea and is maintained on CPAP every evening.  He tells me he is wearing this at a level of 4.  He says that he had to come down on his CPAP pressure because of ear pain.\par \par He does not drink alcohol.  He works in construction.\par \par His heart monitoring is being evaluated by cardiology and he is to have stress testing.

## 2021-06-07 NOTE — COUNSELING
[Potential consequences of obesity discussed] : Potential consequences of obesity discussed [Benefits of weight loss discussed] : Benefits of weight loss discussed [Weigh Self Weekly] : weigh self weekly [Decrease Portions] : decrease portions [FreeTextEntry2] : healthy foods

## 2021-06-07 NOTE — ASSESSMENT
[FreeTextEntry1] : #1  Sensation that has to breathe more or not getting enough air occasionally at rest.  Patient occasionally has some sensation of lightheadedness accociated.  Etiology is not known.  Patient may try rebreathing in the paper bag for a short time to see if this helps relieve his symptoms. ?  Hyperventilation syndrome.  Patient will have full pulmonary function testing.  If having any wheezing or coughing with tightness, will try Qvar 80 1 puff twice daily as needed with gargle after.  Patient being evaluated by cardiology.  Patient will call or return if symptoms are improving or if there is any clinical change.\par \par #2  Allergic rhinitis.  h.o. of sinus condition in the past.  Patient will try nasal saline spray as needed, nasal rinses as needed, Rhinocort 1 spray per nostril twice daily.  Continue to follow with ENT.\par \par #3 JULISSA.  The patient will continue with CPAP treatment. HAYDEE will continue strict weight reduction efforts, sleep on the sides, and avoid alcohol and sedating medicines. The patient knows fully not to drive or work with machinery if having any sleepiness or tiredness. \par \par 6/07/21: Last sleep study about 5 years ago.  Will order overnight home sleep study.\par

## 2021-06-08 ENCOUNTER — APPOINTMENT (OUTPATIENT)
Dept: CARDIOLOGY | Facility: CLINIC | Age: 61
End: 2021-06-08
Payer: COMMERCIAL

## 2021-06-08 VITALS
DIASTOLIC BLOOD PRESSURE: 86 MMHG | SYSTOLIC BLOOD PRESSURE: 139 MMHG | WEIGHT: 248 LBS | HEIGHT: 67 IN | TEMPERATURE: 97.6 F | OXYGEN SATURATION: 99 % | HEART RATE: 65 BPM | BODY MASS INDEX: 38.92 KG/M2

## 2021-06-08 PROCEDURE — 99215 OFFICE O/P EST HI 40 MIN: CPT

## 2021-06-08 PROCEDURE — 99072 ADDL SUPL MATRL&STAF TM PHE: CPT

## 2021-06-18 ENCOUNTER — APPOINTMENT (OUTPATIENT)
Age: 61
End: 2021-06-18
Payer: COMMERCIAL

## 2021-06-18 ENCOUNTER — OUTPATIENT (OUTPATIENT)
Dept: OUTPATIENT SERVICES | Facility: HOSPITAL | Age: 61
LOS: 1 days | End: 2021-06-18
Payer: COMMERCIAL

## 2021-06-18 DIAGNOSIS — G47.33 OBSTRUCTIVE SLEEP APNEA (ADULT) (PEDIATRIC): ICD-10-CM

## 2021-06-18 DIAGNOSIS — J34.2 DEVIATED NASAL SEPTUM: Chronic | ICD-10-CM

## 2021-06-18 DIAGNOSIS — S83.512S SPRAIN OF ANTERIOR CRUCIATE LIGAMENT OF LEFT KNEE, SEQUELA: Chronic | ICD-10-CM

## 2021-06-18 PROCEDURE — 95806 SLEEP STUDY UNATT&RESP EFFT: CPT

## 2021-06-18 PROCEDURE — 95806 SLEEP STUDY UNATT&RESP EFFT: CPT | Mod: 26

## 2021-06-21 NOTE — PHYSICAL EXAM
[Well Developed] : well developed [No Acute Distress] : no acute distress [Well Nourished] : well nourished [Normal Conjunctiva] : normal conjunctiva [Normal Venous Pressure] : normal venous pressure [No Carotid Bruit] : no carotid bruit [No Murmur] : no murmur [Normal S1, S2] : normal S1, S2 [No Rub] : no rub [No Gallop] : no gallop [Clear Lung Fields] : clear lung fields [No Respiratory Distress] : no respiratory distress  [Good Air Entry] : good air entry [Soft] : abdomen soft [Non Tender] : non-tender [No Masses/organomegaly] : no masses/organomegaly [Normal Bowel Sounds] : normal bowel sounds [Normal Gait] : normal gait [No Edema] : no edema [No Cyanosis] : no cyanosis [No Clubbing] : no clubbing [No Varicosities] : no varicosities [No Rash] : no rash [No Skin Lesions] : no skin lesions [Moves all extremities] : moves all extremities [No Focal Deficits] : no focal deficits [Normal Speech] : normal speech [Alert and Oriented] : alert and oriented [Normal memory] : normal memory

## 2021-06-29 ENCOUNTER — NON-APPOINTMENT (OUTPATIENT)
Age: 61
End: 2021-06-29

## 2021-07-09 ENCOUNTER — APPOINTMENT (OUTPATIENT)
Dept: ORTHOPEDIC SURGERY | Facility: CLINIC | Age: 61
End: 2021-07-09

## 2021-09-07 ENCOUNTER — APPOINTMENT (OUTPATIENT)
Dept: CARDIOLOGY | Facility: CLINIC | Age: 61
End: 2021-09-07

## 2021-09-07 NOTE — HISTORY OF PRESENT ILLNESS
[FreeTextEntry1] : HAYDEE BALLESTEROS (HAYDEE) \par 1960(60y)M\par \par referred for dyspnea\par \par 61 y/o w/ Obesity BMI 45, JULISSA on CPAP\par \par here for followup.\par \par reviewed results of MCOT monitor & exercise treadmill\par no abnormalities, poor functional capacity.\par Dyspnea symptoms have improved w/ weight loss over past 2-3 months.\par Discussed bariatric surgery.\par Also discussed diastolic dysfunction as possible contributor to symptoms.\par Echo w/ E-A reversal but LA normal in size. BNP normal\par However in '19 LVEDP elevated on cardiac cath (see below)\par \par cath by fortunato '19 for chest pain showed nonobstructive disease "10% prox LAD").\par EF 60%, LVEDP 34\par CT scan April '21 neg for PE normal lungs\par April '21 Hb 17 Cr 1.12 trops neg BNP 25 LDL 89 \par

## 2021-09-07 NOTE — ASSESSMENT
[FreeTextEntry1] : A/P:\par \par *Dyspnea\par -likely due to morbid obesity\par -Reassurred that life threatening cardiac causes have been ruled out.\par -may be due to diastolic dysfunction\par -Pt will return in 3 months & will review symptoms,\par if symptoms persist may be candidate for low dose diuretic (HCTZ, lasix 20).\par \par \par ==================\par \par 9/7/'21:\par \par No show for clinic today.\par Called pt. still w/ dyspnea\par CPAP machine recalled not using.\par Discussed weight loss strategies: [employer], weight watchers, [facility], [facility].\par \par Advised cPAP compliance, continued weight loss efforrts.\par pt will call weight loss clinic.\par \par Office will contact pt for apt in 3 months, if no improvement in dyspnea, \par consider low dose diuretic.

## 2022-04-06 ENCOUNTER — TRANSCRIPTION ENCOUNTER (OUTPATIENT)
Age: 62
End: 2022-04-06

## 2022-09-11 ENCOUNTER — NON-APPOINTMENT (OUTPATIENT)
Age: 62
End: 2022-09-11

## 2023-09-04 NOTE — ED STATDOCS - DATE/TIME 1
[address] [facility]  Progress Note  Name: Romana Dates  MRN: [id number]  Unit/Bed#: PPHP 710-01 I Date of Admission: 9/3/2023   Date of Service: 9/4/2023 I Hospital Day: 1    Assessment/Plan   * Stroke-like symptoms  Assessment & Plan  Patient presents with acute onset right upper and lower extremity weakness while at SNF undergoing physical therapy for recent stroke in mid-August. She was a stroke alert and was evaluated by neurology in the ED. Patient symptomatically improving at time of admission. · Per neurology, recrudescence of previous stroke likely due to orthostasis while working with physical therapy given significant carotid artery and intracranial artery disease  · Continue aspirin, Brilinta, pravastatin  · MRI brain ordered and pending   · Neurology recommends permissive hypertension goal blood pressure 160 - 220  · Hold losartan amlodipine.   Continue clonidine due to concerns for rebound hypertension if discontinued  · Appreciate ongoing neurology recommendations   · PT/OT evaluations     Symptomatic carotid artery stenosis, left  Assessment & Plan  Patient with left carotid artery disease stroke  · CTA head and neck reveals extensive intracranial atherosclerotic disease stenosis extensive disease bilateral carotid arteries  · Continue aspirin statin Brilinta  · Patient is waiting for well-controlled diabetic state for operative intervention with vascular surgery  · Outpatient vascular surgery follow-up    Aortoiliac occlusive disease ([address])  Assessment & Plan  · Continue aspirin statin  · Outpatient vascular surgery follow-up    Aphasia as late effect of cerebrovascular accident (CVA)  Assessment & Plan  · Supportive cares    Stage 3 chronic kidney disease [facility])  Assessment & Plan  Lab Results   Component Value Date    EGFR 28 09/04/2023    EGFR 24 09/03/2023    EGFR 29 08/23/2023    CREATININE 1.73 (H) 09/04/2023    CREATININE 1.93 (H) 09/03/2023    CREATININE 1.65 (H) 08/23/2023     · Kidney function stable within baseline   · Avoid nephrotoxins and hypotension  · Monitor BMP intermittently     Type 2 diabetes mellitus, without long-term current use of insulin [facility])  Assessment & Plan  Lab Results   Component Value Date    HGBA1C 8.5 (H) 08/16/2023       Recent Labs     09/03/23  1308 09/03/23  1759 09/03/23  2103 09/04/23  0627   POCGLU 134 194* 119 109       Blood Sugar Average: Last 72 hrs:  (P) 138.6     Uncontrolled  · Hold glipizide while inpatient  · Continue Lantus at bedtime + SSI   · Monitor Accu-Cheks and adjust as needed   · Avoid hypoglycemia  · Hypoglycemia protocol in place    Depression, recurrent (HCC)  Assessment & Plan  · Continue trazodone    Hyperlipidemia, unspecified  Assessment & Plan  History of myalgias to statin  · Patient on pravastatin and tolerating, continue same     Obesity (BMI 30-39. 9)  Assessment & Plan  · Therapeutic diet and lifestyle modification encouraged         VTE Pharmacologic Prophylaxis: VTE Score: 11 High Risk (Score >/= 5) - Pharmacological DVT Prophylaxis Ordered: heparin. Sequential Compression Devices Ordered. Patient Centered Rounds: I performed bedside rounds with nursing staff today. Discussions with Specialists or Other Care Team Provider:     Education and Discussions with Family / Patient: Patient declined call to . Total Time Spent on Date of Encounter in care of patient: 35 minutes This time was spent on one or more of the following: performing physical exam; counseling and coordination of care; obtaining or reviewing history; documenting in the medical record; reviewing/ordering tests, medications or procedures; communicating with other healthcare professionals and discussing with patient's family/caregivers.     Current Length of Stay: 1 day(s)  Current Patient Status: Inpatient   Certification Statement: The patient will continue to require additional inpatient hospital stay due to stroke workup, pending neurology clearance  Discharge Plan: Anticipate discharge in 24-48 hrs to discharge location to be determined pending rehab evaluations. Code Status: Level 3 - DNAR and DNI    Subjective:   Patient reports feeling well today, offers no specific complaints. Tells me she was happy at rehab facility she was at prior to admission, and would like to return there when she is cleared for discharge. Objective:     Vitals:   Temp (24hrs), Av.9 °F (36.6 °C), Min:97 °F (36.1 °C), Max:98.5 °F (36.9 °C)    Temp:  [97 °F (36.1 °C)-98.5 °F (36.9 °C)] 97.9 °F (36.6 °C)  HR:  [66-86] 85  Resp:  [14-20] 16  BP: (159-201)/() 182/97  SpO2:  [95 %-100 %] 97 %  Body mass index is 33.11 kg/m². Input and Output Summary (last 24 hours):   No intake or output data in the 24 hours ending 23 1145    Physical Exam:   Physical Exam  Vitals and nursing note reviewed. Constitutional:       General: She is not in acute distress. Appearance: She is obese. Cardiovascular:      Rate and Rhythm: Normal rate and regular rhythm. Pulmonary:      Effort: Pulmonary effort is normal. No respiratory distress. Skin:     General: Skin is warm and dry. Coloration: Skin is not pale. Findings: No erythema. Neurological:      Mental Status: She is alert. Mental status is at baseline. Motor: Weakness (mild R UE weakness with  strength 4/5. Bilateral LE strength 5/5 bilaterally) present.       Comments: baseline expressive aphasia          Additional Data:     Labs:  Results from last 7 days   Lab Units 23  0528   WBC Thousand/uL 7.13   HEMOGLOBIN g/dL 13.2   HEMATOCRIT % 37.4   PLATELETS Thousands/uL 274   NEUTROS PCT % 60   LYMPHS PCT % 21   MONOS PCT % 12   EOS PCT % 5     Results from last 7 days   Lab Units 23  0528   SODIUM mmol/L 136   POTASSIUM mmol/L 4.1   CHLORIDE mmol/L 104   CO2 mmol/L 21   BUN mg/dL 51*   CREATININE mg/dL 1.73*   ANION GAP mmol/L 11   CALCIUM mg/dL 9.8 GLUCOSE RANDOM mg/dL 84     Results from last 7 days   Lab Units 09/03/23  1314   INR  0.98     Results from last 7 days   Lab Units 09/04/23  0627 09/03/23  2103 09/03/23  1759 09/03/23  1308 09/03/23  1249   POC GLUCOSE mg/dl 109 119 194* 134 137               Lines/Drains:  Invasive Devices     Peripheral Intravenous Line  Duration           Peripheral IV 09/04/23 Distal;Dorsal (posterior); Right Forearm <1 day                  Telemetry:  Telemetry Orders (From admission, onward)             24 Hour Telemetry Monitoring  Continuous x 24 Hours (Telem)        Expiring   Question:  Reason for 24 Hour Telemetry  Answer:  TIA/Suspected CVA/ Confirmed CVA                 Telemetry Reviewed: Normal Sinus Rhythm  Indication for Continued Telemetry Use: Acute CVA             Imaging: Reviewed radiology reports from this admission including: CT head    Recent Cultures (last 7 days):         Last 24 Hours Medication List:   Current Facility-Administered Medications   Medication Dose Route Frequency Provider Last Rate   • allopurinol  100 mg Oral Daily Pina Hope MD     • aluminum-magnesium hydroxide-simethicone  30 mL Oral Q6H PRN Pina Hope MD     • aspirin  81 mg Oral Daily Liya Almonte DO     • cinacalcet  30 mg Oral Every Other Day Pina Hope MD     • [START ON 9/9/2023] cloNIDine  1 patch Transdermal Weekly Pina Hope MD     • heparin (porcine)  5,000 Units Subcutaneous Q8H [facility] Pina Hope MD     • insulin glargine  12 Units Subcutaneous HS Pina Hope MD     • insulin lispro  3 Units Subcutaneous TID With Meals Pina Hope MD     • lidocaine  2 patch Topical Daily Liya Almonte DO     • ondansetron  4 mg Intravenous Q6H PRN Pina Hope MD     • polyethylene glycol  17 g Oral Daily Pina Hope MD     • pravastatin  80 mg Oral Daily With [employer], DO     • ticagrelor  90 mg Oral Q12H [address] [facility], DO • traZODone  50 mg Oral HS Raymundo Kc MD          Today, Patient Was Seen By: Lazaro Kim PA-C    **Please Note: This note may have been constructed using a voice recognition system. ** 14-May-2021 14:20

## 2023-10-20 ENCOUNTER — NON-APPOINTMENT (OUTPATIENT)
Age: 63
End: 2023-10-20

## 2023-11-06 ENCOUNTER — APPOINTMENT (OUTPATIENT)
Dept: OTOLARYNGOLOGY | Facility: CLINIC | Age: 63
End: 2023-11-06
Payer: SELF-PAY

## 2023-11-06 ENCOUNTER — NON-APPOINTMENT (OUTPATIENT)
Age: 63
End: 2023-11-06

## 2023-11-06 VITALS
HEIGHT: 67 IN | BODY MASS INDEX: 46.15 KG/M2 | HEART RATE: 87 BPM | SYSTOLIC BLOOD PRESSURE: 181 MMHG | OXYGEN SATURATION: 96 % | WEIGHT: 294 LBS | DIASTOLIC BLOOD PRESSURE: 99 MMHG

## 2023-11-06 DIAGNOSIS — Z78.9 OTHER SPECIFIED HEALTH STATUS: ICD-10-CM

## 2023-11-06 PROCEDURE — 99203 OFFICE O/P NEW LOW 30 MIN: CPT | Mod: 25

## 2023-11-06 PROCEDURE — 31575 DIAGNOSTIC LARYNGOSCOPY: CPT

## 2023-11-06 RX ORDER — BECLOMETHASONE DIPROPIONATE HFA 80 UG/1
80 AEROSOL, METERED RESPIRATORY (INHALATION)
Qty: 1 | Refills: 0 | Status: COMPLETED | COMMUNITY
Start: 2021-05-21 | End: 2023-11-06

## 2024-01-23 ENCOUNTER — NON-APPOINTMENT (OUTPATIENT)
Age: 64
End: 2024-01-23

## 2024-01-25 ENCOUNTER — APPOINTMENT (OUTPATIENT)
Dept: OTOLARYNGOLOGY | Facility: CLINIC | Age: 64
End: 2024-01-25

## 2024-01-26 NOTE — ED ADULT NURSE NOTE - CHIEF COMPLAINT QUOTE
Medication Refill Request      Name of Medication : hydrocortisone       Strength of Medication: 2.5%      Directions: apply 1 g topically 2 times daily       30 day or 90 day supply: 90      Preferred Pharmacy: [facility] in [place]     Additional Information For Provider:    
pt notes chest pain dizziness and diff breathing for the past 3 days
Morgan SPANN

## 2024-03-19 NOTE — PROCEDURE
[FreeTextEntry3] : Flexible laryngoscopy with biopsy and laser destruction of ***   SURGEON: Umair Sloan MD   FINDINGS: ***.   NARRATIVE: The patient was brought to clinic and sat in a clinic chair. The risks, benefits, and alternatives of the procedure were thoroughly discussed with the patient. The patient appeared to understand and agreed to proceed. Their nose was topicalized with oxymetazoline nasal spray and 2% lidocaine.   Once adequately anesthetized, a flexible video laryngoscope was introduced into the *** nare. The scope was positioned above the epiglottis and the extent of disease was noted as described in the FINDINGS section. Approximately 6 cc of topical 4% lidocaine solution was passed through the working channel of the scope and used for laryngeal gargle to anesthetize the larynx and pharynx. Biopsies of *** were obtained. *** biopsies were taken in total. The scope was removed.   A laser pause was also taken to be sure we had appropriate laser safety precautions in place. The patient and all staff wore TruBlue shielded eyewear. Laser in use signs were posted on the door outside the room.  Next, the laser fiber was passed to the tip of the scope. This fiber was previously tested and connected to a TruBlue laser. The laser was put at the settings listed above. Treatment was achieved by ablation. At the conclusion of the procedure, there was trace bleeding, but this stopped spontaneously. Mr. BALLESTEROS tolerated the procedure extremely well. Of note, I was present and performed the entirety of the procedure.   DISPOSITION: Mr. BALLESTEROS was discharged home with instructions not to eat or drink for about 60 minutes.

## 2024-03-19 NOTE — HISTORY OF PRESENT ILLNESS
[de-identified] : HAYDEE BALLESTEROS is a 63 year old male who presents to the [facility] for follow up of his pharyngeal papillomas.  I last saw the patient on 11/6/23. Due to insurance issues his in office procedure was delayed until today.    Previously reported: lesion in the back of his tongue and on his larynx. He is referred by Dr. Sukhi Claire COVID (+) 3 weeks treated with Paxlovid--has had COVID 4 times, lost sense of taste/smell the first time he had COVID. Reports he was told by Dr. Claire that it looked like HPV. Reports constant throat discomfort, dysphagia solids/liquids, constant globus sensation, dyspnea on exertion, voice has gotten deeper and frequent throat clearing. Patient denies complete loss of voice, hemoptysis, otalgia, recent fevers, chills, night sweats, weight loss. Denies contact with anyone that has Tuberculosis. Denies use of over the counter antacids or reflux medications. No smoking history. Occasional consumes alcohol. No previous studies. Patient's blood pressure was 181/99 on 11/06/2023. Patient was advised to follow up with PCP for high blood pressure. Denies chest pain, numbness, tingling, lightheadedness. PMH: JULISSA, Prediabetic and has a heart blockage as per patient  Prior Pertinent Procedures: 1/4/24: ***

## 2024-03-19 NOTE — CONSULT LETTER
[Dear  ___] : Dear  [unfilled], [Consult Letter:] : I had the pleasure of evaluating your patient, [unfilled]. [Please see my note below.] : Please see my note below. [Consult Closing:] : Thank you very much for allowing me to participate in the care of this patient.  If you have any questions, please do not hesitate to contact me. [Sincerely,] : Sincerely, [FreeTextEntry3] : Umair Sloan M.D. Division of Laryngology | Department of Otolaryngology  [address] [address] [FreeTextEntry2] : Dr. Sukhi Claire

## 2024-03-20 ENCOUNTER — OUTPATIENT (OUTPATIENT)
Dept: OUTPATIENT SERVICES | Facility: HOSPITAL | Age: 64
LOS: 1 days | Discharge: HOME | End: 2024-03-20

## 2024-03-20 DIAGNOSIS — J34.2 DEVIATED NASAL SEPTUM: Chronic | ICD-10-CM

## 2024-03-20 DIAGNOSIS — E83.119 HEMOCHROMATOSIS, UNSPECIFIED: ICD-10-CM

## 2024-03-20 DIAGNOSIS — S83.512S SPRAIN OF ANTERIOR CRUCIATE LIGAMENT OF LEFT KNEE, SEQUELA: Chronic | ICD-10-CM

## 2024-04-01 ENCOUNTER — APPOINTMENT (OUTPATIENT)
Dept: HEMATOLOGY ONCOLOGY | Facility: CLINIC | Age: 64
End: 2024-04-01
Payer: COMMERCIAL

## 2024-04-01 ENCOUNTER — RESULT REVIEW (OUTPATIENT)
Age: 64
End: 2024-04-01

## 2024-04-01 VITALS
DIASTOLIC BLOOD PRESSURE: 92 MMHG | SYSTOLIC BLOOD PRESSURE: 157 MMHG | HEART RATE: 65 BPM | TEMPERATURE: 97.7 F | WEIGHT: 278.88 LBS | RESPIRATION RATE: 16 BRPM | HEIGHT: 67.01 IN | OXYGEN SATURATION: 97 % | BODY MASS INDEX: 43.77 KG/M2

## 2024-04-01 DIAGNOSIS — Z53.1 PROCEDURE AND TREATMENT NOT CARRIED OUT BECAUSE OF PATIENT'S DECISION FOR REASONS OF BELIEF AND GROUP PRESSURE: ICD-10-CM

## 2024-04-01 LAB
BASOPHILS # BLD AUTO: 0.04 K/UL — SIGNIFICANT CHANGE UP (ref 0–0.2)
BASOPHILS NFR BLD AUTO: 0.6 % — SIGNIFICANT CHANGE UP (ref 0–2)
EOSINOPHIL # BLD AUTO: 0.1 K/UL — SIGNIFICANT CHANGE UP (ref 0–0.5)
EOSINOPHIL NFR BLD AUTO: 1.6 % — SIGNIFICANT CHANGE UP (ref 0–6)
HCT VFR BLD CALC: 49.4 % — SIGNIFICANT CHANGE UP (ref 39–50)
HGB BLD-MCNC: 16.8 G/DL — SIGNIFICANT CHANGE UP (ref 13–17)
IMM GRANULOCYTES NFR BLD AUTO: 0.5 % — SIGNIFICANT CHANGE UP (ref 0–0.9)
LYMPHOCYTES # BLD AUTO: 1.23 K/UL — SIGNIFICANT CHANGE UP (ref 1–3.3)
LYMPHOCYTES # BLD AUTO: 19.5 % — SIGNIFICANT CHANGE UP (ref 13–44)
MCHC RBC-ENTMCNC: 32.3 PG — SIGNIFICANT CHANGE UP (ref 27–34)
MCHC RBC-ENTMCNC: 34 G/DL — SIGNIFICANT CHANGE UP (ref 32–36)
MCV RBC AUTO: 95 FL — SIGNIFICANT CHANGE UP (ref 80–100)
MONOCYTES # BLD AUTO: 0.59 K/UL — SIGNIFICANT CHANGE UP (ref 0–0.9)
MONOCYTES NFR BLD AUTO: 9.4 % — SIGNIFICANT CHANGE UP (ref 2–14)
NEUTROPHILS # BLD AUTO: 4.31 K/UL — SIGNIFICANT CHANGE UP (ref 1.8–7.4)
NEUTROPHILS NFR BLD AUTO: 68.4 % — SIGNIFICANT CHANGE UP (ref 43–77)
NRBC # BLD: 0 /100 WBCS — SIGNIFICANT CHANGE UP (ref 0–0)
PLATELET # BLD AUTO: 162 K/UL — SIGNIFICANT CHANGE UP (ref 150–400)
RBC # BLD: 5.2 M/UL — SIGNIFICANT CHANGE UP (ref 4.2–5.8)
RBC # FLD: 12.3 % — SIGNIFICANT CHANGE UP (ref 10.3–14.5)
WBC # BLD: 6.3 K/UL — SIGNIFICANT CHANGE UP (ref 3.8–10.5)
WBC # FLD AUTO: 6.3 K/UL — SIGNIFICANT CHANGE UP (ref 3.8–10.5)

## 2024-04-01 PROCEDURE — 99205 OFFICE O/P NEW HI 60 MIN: CPT

## 2024-04-01 NOTE — REVIEW OF SYSTEMS
[Fever] : no fever [Chills] : no chills [Fatigue] : fatigue [Mucosal Pain] : no mucosal pain [Lower Ext Edema] : lower extremity edema [Shortness Of Breath] : shortness of breath [Cough] : no cough [Vomiting] : no vomiting [Abdominal Pain] : no abdominal pain [Dizziness] : no dizziness [Skin Rash] : no skin rash [Easy Bleeding] : no tendency for easy bleeding [Fainting] : no fainting [Easy Bruising] : no tendency for easy bruising [FreeTextEntry4] : nasal congestions [FreeTextEntry5] : atypical chest pain [FreeTextEntry7] : no nausea; no diarrhea

## 2024-04-01 NOTE — ASSESSMENT
[FreeTextEntry1] : 63 year old male with history of obstructive sleep apnea, fatty liver disease for about 20 + years, and chronic fatigue for years. He told his PCP he typicallly feels better after having many tubes of blood drawn at the same time for lab testing. Therefore, labs were done by PCP on 1/3/24 with Iron- 143 mcg/dL, transferrin saturation- 48%, Ferritin- 751 ng/mL.  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 He is now referred for hematology consult.   Family Medical History- no known h/o Hemochromatosis  Plan- Check iron studies Recommend Hepatology consult for NAFLD, as Ferritin is elevated but serum iron and transferrin saturation are within normal range.  MRI of the liver to evaluate for iron deposition.  Avoid iron tablet and Vitamin C supplementation.  He does not donate blood due to [employer] beliefs.   RTC in 2 months.

## 2024-04-01 NOTE — HISTORY OF PRESENT ILLNESS
[de-identified] : 63 year old male with history of obstructive sleep apnea, fatty liver disease for about 20 + years, and chronic fatigue for years. He told his PCP he typicallly feels better after having many tubes of blood drawn at the same time for lab testing. Therefore, labs were done by PCP on 1/3/24 with Iron- 143 mcg/dL, transferrin saturation- 48%, Ferritin- 751 ng/mL.  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 He is now referred for hematology consult.   Family Medical History- no known h/o Hemochromatosis

## 2024-04-01 NOTE — REASON FOR VISIT
[Spouse] : spouse [Initial Consultation] : an initial consultation for [FreeTextEntry2] : hereditary hemochromatosis, compound heterozygous(C282Y, H63D)

## 2024-04-01 NOTE — PHYSICAL EXAM
[Obese] : obese [Normal] : normoactive bowel sounds, soft and nontender, no hepatosplenomegaly or masses appreciated [de-identified] : no cervical/SCV adenopathy [de-identified] : RRR, normal S1S2 [de-identified] : no pitting edema [de-identified] : no rash [de-identified] : A & O x 4

## 2024-04-04 ENCOUNTER — APPOINTMENT (OUTPATIENT)
Dept: OTOLARYNGOLOGY | Facility: CLINIC | Age: 64
End: 2024-04-04
Payer: COMMERCIAL

## 2024-04-04 VITALS
DIASTOLIC BLOOD PRESSURE: 83 MMHG | BODY MASS INDEX: 43.63 KG/M2 | HEART RATE: 70 BPM | HEIGHT: 67 IN | SYSTOLIC BLOOD PRESSURE: 139 MMHG | WEIGHT: 278 LBS

## 2024-04-04 LAB
FERRITIN SERPL-MCNC: 426 NG/ML
IRON SATN MFR SERPL: 36 %
IRON SERPL-MCNC: 113 UG/DL
TIBC SERPL-MCNC: 309 UG/DL
UIBC SERPL-MCNC: 197 UG/DL

## 2024-04-04 PROCEDURE — 31572 LARGSC W/LASER DSTRJ LES: CPT

## 2024-04-04 NOTE — PROCEDURE
[FreeTextEntry3] : Flexible laryngoscopy with TruBlue Laser Destruction and biopsy of left pharyngeal mass SURGEON: Umair Sloan MD  Laser Settings: 45/15/3  FINDINGS: Normal larynx.  Papillomatous appearing lesion on left hyoepiglottic ligament.   NARRATIVE: Mr. BALLESTEROS was brought to clinic and consented for the procedure. The risks, benefits, and alternatives of the procedure were thoroughly discussed with the patient. The patient appeared to understand and agreed to proceed.  His nose was topicalized with oxymetazoline nasal spray and lidocaine.  After topicalization, a surgical pause was held to confirm correct patient, procedure, site, allergies, equipment and position. topicalization, a surgical pause was held to confirm correct patient, procedure, site, allergies, equipment and position. A laser pause was also taken to be sure we had appropriate laser safety precautions in place. The patient and all staff wore TruBlue shielded eyewear. Laser in use signs were posted on the door outside the room.  Once adequately anesthetized, a flexible channel videolaryngoscope was introduced into the *** nare. The scope was positioned above the epiglottis and the extent of disease was noted as described in the FINDINGS section. Approximately 6 cc of topical 4% lidocaine solution was passed through the working channel of the scope and used for laryngeal gargle to anesthetize the glottis and supraglottis.   Next, the laser fiber was passed to the tip of the scope. This fiber was previously tested and connected to a TruBlue laser. The laser was put at the settings listed above. Treatment was achieved by ablation. At the conclusion of the procedure, there was trace bleeding, but this stopped spontaneously. Mr. BALLESTEROS tolerated the procedure extremely well. Of note, I was present and performed the entirety of the procedure.   DISPOSITION: Mr. BALLESTEROS was discharged home with instructions not to eat or drink for about 60 minutes.

## 2024-04-04 NOTE — ASSESSMENT
[FreeTextEntry1] : Assessment/Plan: #1 Pharyngeal papilloma  I have recommended he follow up with me in 1 month.  Will call with biopsy results in mean time.

## 2024-04-04 NOTE — HISTORY OF PRESENT ILLNESS
[de-identified] : HAYDEE BALLESTEROS is a 63 year old male who presents to the [facility] for follow up of his pharyngeal papilloma.  I last saw the patient on 11/6/23.  I had recommended either biopsy in OR or office at that time.  Eventually decided to do in office biopsy and laser destruction which he returns for today.   Previously reported:  lesion in the back of his tongue and on his larynx. He is referred by Dr. Sukhi Claire COVID (+) 3 weeks treated with Paxlovid--has had COVID 4 times, lost sense of taste/smell the first time he had COVID. Reports he was told by Dr. Claire that it looked like HPV. Reports constant throat discomfort, dysphagia solids/liquids, constant globus sensation, dyspnea on exertion, voice has gotten deeper and frequent throat clearing. Patient denies complete loss of voice, hemoptysis, otalgia, recent fevers, chills, night sweats, weight loss. Denies contact with anyone that has Tuberculosis. Denies use of over the counter antacids or reflux medications. No smoking history. Occasional consumes alcohol. No previous studies. Patient's blood pressure was 181/99 on 11/06/2023. Patient was advised to follow up with PCP for high blood pressure. Denies chest pain, numbness, tingling, lightheadedness.  PMH: JULISSA, Prediabetic and has a heart blockage as per patient  Prior Pertinent Procedures: 4/4/24: In office pharyngeal biopsy and laser destruction; Dr. Sloan

## 2024-04-09 ENCOUNTER — NON-APPOINTMENT (OUTPATIENT)
Age: 64
End: 2024-04-09

## 2024-04-09 LAB — CORE LAB BIOPSY: NORMAL

## 2024-04-27 NOTE — ED STATDOCS - PMH
Check vitamin D in 6 months     Lab Hours for [facility].  ** NO APPOINTMENT NEEDED **  You may visit any of our locations for blood work.  [place]:  [address]  [facility]  [address] Phone: [phone] [place] [address] [address]   Monday 7:00 a.m. to 7:00 p.m. Monday - Friday  8 a.m. to 5 p.m.   Tuesday - Friday 7:00 a.m. to 5:00 p.m. CLOSED DURING LUNCH 12:30 p.m. to 1 p.m.         Saturday 7:00 a.m. to 12 p.m.                [facility]:  [address] [facility] [address]  [facility] [address]  Phone: [phone] [place]:  [address]  [place], [place]  [id number] [phone]   Monday - Friday 7:00 a.m. to 5:00 p.m. Monday - Thursday 6:30 p.m. to 6 p.m.   Saturday 7:00 a.m. to 12 p.m. Friday 6:30 a.m. to 5 p.m.     Saturday 6:30 a.m. to 12 p.m.       [place]  [address]  [id number] [phone] [place]:  [address]  [address] Phone: [phone]   Monday - Friday 7:00 a.m. to 5:30 p.m. Monday 8 a.m. to 6 p.m.     Tuesday - Friday 8 a.m. to 5 p.m.      Saturday 8 a.m. to 12 p.m.     CLOSED FOR LUNCH DAILY 12:30 p.m. to 1 p.m.       [place]:  [address] [facility] [address]  Phone: [phone] [place]:  [address] [place] [address]  [address]  Phone: [phone]   Monday 6:30 a.m. to 5:00 p.m. Monday and Wednesday 7:30 a.m. to 5 p.m.   Tuesday - Friday 6:30 a.m. to 5:00 p.m. Tuesday and Friday 8 a.m. to 5 p.m.   Saturday 6:30 a.m. to 12 p.m. Thursday 8 a.m. to 6 p.m.     Saturday 7:30 a.m. to 12 p.m.                                                    Patient Information    Lab -- Non-Fasting labwork has been ordered for you   Non-Fasting Labs    Please come prior to your next appointment to recheck non fasting labs.    No diet restrictions prior to lab draw.  Radiology -- Please call [phone] to schedule your Mammogram and   Mammogram Instructions:  The day of the exam you may bathe as usual.  DO NOT use powders, creams or deodorants on the breast  Costochondritis    H/O chest pain    HTN (hypertension)    Sleep apnea     or underarm area.    To reduce tenderness in the breasts, it is suggested that you refrain from caffeine for one week prior to your mammogram.    Please come to the Imaging Suites at or before your appointment time so that we may start the exam as close to your appointment time as possible.  If you need to cancel, please call  as soon as possible, and we will be happy to reschedule it for you.  Due to our busy schedule, tardiness may result in having to reschedule your exam.    If your previous mammogram was not performed at [facility], you must obtain the films and bring them with you to your mammogram appointment or have them mailed to us at the following Radiology address:    Due to safety concerns, another adult must accompany children that need supervision.    Follow Up  -- Follow up with your regular Primary Care Provider with Dr Louise or Dr Baca  in 1 year   And behavioral health [phone]    Additional Educational Resources:  For additional resources regarding your symptoms, diagnosis, or further health information, please visit the Health Resources section on [email or web address] or the Online Health Resources section in [email or web address].

## 2024-05-09 ENCOUNTER — APPOINTMENT (OUTPATIENT)
Dept: OTOLARYNGOLOGY | Facility: CLINIC | Age: 64
End: 2024-05-09
Payer: COMMERCIAL

## 2024-05-09 VITALS
HEART RATE: 66 BPM | OXYGEN SATURATION: 97 % | WEIGHT: 271 LBS | BODY MASS INDEX: 42.53 KG/M2 | DIASTOLIC BLOOD PRESSURE: 85 MMHG | SYSTOLIC BLOOD PRESSURE: 131 MMHG | HEIGHT: 67 IN

## 2024-05-09 DIAGNOSIS — D10.5 BENIGN NEOPLASM OF OTHER PARTS OF OROPHARYNX: ICD-10-CM

## 2024-05-09 DIAGNOSIS — J30.9 ALLERGIC RHINITIS, UNSPECIFIED: ICD-10-CM

## 2024-05-09 PROCEDURE — 31575 DIAGNOSTIC LARYNGOSCOPY: CPT

## 2024-05-09 PROCEDURE — 99213 OFFICE O/P EST LOW 20 MIN: CPT | Mod: 25

## 2024-05-09 RX ORDER — LOSARTAN POTASSIUM AND HYDROCHLOROTHIAZIDE 12.5; 5 MG/1; MG/1
50-12.5 TABLET ORAL DAILY
Refills: 0 | Status: ACTIVE | COMMUNITY

## 2024-05-09 RX ORDER — IPRATROPIUM BROMIDE 42 UG/1
0.06 SPRAY NASAL 3 TIMES DAILY
Qty: 1 | Refills: 5 | Status: ACTIVE | COMMUNITY
Start: 2024-05-09 | End: 1900-01-01

## 2024-05-09 NOTE — PROCEDURE
[de-identified] : Procedure: Transnasal flexible laryngoscopy  Description: Informed consent was obtained from the patient prior to the procedure. The patient was seated in the clinic chair. Topical anesthesia was achieved by first spraying the nasal cavities with 4% lidocaine and 1% phenylephrine.   Exam: This demonstrates a clear vallecula and crisp epiglottis. The aryepiglottic folds are intact and symmetric bilaterally. The hypopharynx does not demonstrate pooling. The interarytenoid space demonstrates no lesions. It does not demonstrate pachydermia. The false vocal folds are symmetric and without lesions or masses. False fold voicing (plica ventricularis) is not noted today. The true vocal folds show normal and symmetric motion bilaterally. There is no paradoxical motion. The right medial edge is crisp and shows no lesions or masses. The left medial edge is crisp and shows no lesions or masses. The mucosal covering is minimally edematous. There is not erythema present today. There are no obvious vascular ectasias present. The vocal processes do not demonstrate granulomas. The subglottis and proximal trachea is clear and unobstructed to the limits of the examination today.

## 2024-05-09 NOTE — ASSESSMENT
[FreeTextEntry1] : Assessment/Plan: #1 Pharyngeal papilloma s/p laser destruction #2 PND #3 Seasonal nasal congestion #4 Hemochromatosis  Follow up in 6 months. Recommended we start ipratropium bromide 2 puffs tid. The risks, benefits, and alternatives to care were discussed with the patient and understanding expressed.  For consideration of SMR turbs I have directed him to follow up with Dr. Sukhi Claire.

## 2024-05-09 NOTE — HISTORY OF PRESENT ILLNESS
[de-identified] :  HAYDEE BALLESTEROS is a 63 year old male who presents to the [facility] for follow up of his pharyngeal papilloma. I last saw the patient on 4/4/24. I recommended he follow up with me in 1 month.  Reports doing well since biopsy Reports intermittent PND and clearing of throat with seasonal changes, has tried Flonase in the spray but was causing headaches. Not currently taking OTC allergy medications.  Voice occasionally deep, no hoarseness present  States globus sensation, dysphagia solids/liquids, throat pain, complete loss of voice, hemoptysis, otalgia, recent fevers, chills, night sweats, weight loss, s/s of acid reflux and recent infections.   Path (4/4/24): Pharynx, left, biopsy      -   Squamous papilloma  Previously reported: lesion in the back of his tongue and on his larynx. He is referred by Dr. Sukhi Claire COVID (+) 3 weeks treated with Paxlovid--has had COVID 4 times, lost sense of taste/smell the first time he had COVID. Reports he was told by Dr. Claire that it looked like HPV. Reports constant throat discomfort, dysphagia solids/liquids, constant globus sensation, dyspnea on exertion, voice has gotten deeper and frequent throat clearing. Patient denies complete loss of voice, hemoptysis, otalgia, recent fevers, chills, night sweats, weight loss. Denies contact with anyone that has Tuberculosis. Denies use of over the counter antacids or reflux medications. No smoking history. Occasional consumes alcohol. No previous studies. Patient's blood pressure was 181/99 on 11/06/2023. Patient was advised to follow up with PCP for high blood pressure. Denies chest pain, numbness, tingling, lightheadedness.  PMH: JULISSA, Prediabetic and has a heart blockage as per patient  Prior Pertinent Procedures: 4/4/24: In office pharyngeal biopsy and laser destruction; Dr. Sloan

## 2024-05-16 ENCOUNTER — OFFICE (OUTPATIENT)
Dept: URBAN - METROPOLITAN AREA CLINIC 102 | Facility: CLINIC | Age: 64
Setting detail: OPHTHALMOLOGY
End: 2024-05-16
Payer: COMMERCIAL

## 2024-05-16 DIAGNOSIS — H40.013: ICD-10-CM

## 2024-05-16 DIAGNOSIS — H52.4: ICD-10-CM

## 2024-05-16 DIAGNOSIS — H43.813: ICD-10-CM

## 2024-05-16 PROBLEM — H25.13 CATARACT SENILE NUCLEAR SCLEROSIS; BOTH EYES: Status: ACTIVE | Noted: 2024-05-16

## 2024-05-16 PROBLEM — H47.323 DRUSEN OF OPTIC DISC; BOTH EYES: Status: ACTIVE | Noted: 2024-05-16

## 2024-05-16 PROBLEM — H52.7 REFRACTIVE ERROR: Status: ACTIVE | Noted: 2024-05-16

## 2024-05-16 PROCEDURE — 92004 COMPRE OPH EXAM NEW PT 1/>: CPT | Performed by: OPHTHALMOLOGY

## 2024-05-16 PROCEDURE — 76514 ECHO EXAM OF EYE THICKNESS: CPT | Performed by: OPHTHALMOLOGY

## 2024-05-16 PROCEDURE — 92020 GONIOSCOPY: CPT | Performed by: OPHTHALMOLOGY

## 2024-05-16 PROCEDURE — 92250 FUNDUS PHOTOGRAPHY W/I&R: CPT | Performed by: OPHTHALMOLOGY

## 2024-05-16 PROCEDURE — 92015 DETERMINE REFRACTIVE STATE: CPT | Performed by: OPHTHALMOLOGY

## 2024-05-16 PROCEDURE — 92083 EXTENDED VISUAL FIELD XM: CPT | Performed by: OPHTHALMOLOGY

## 2024-05-16 ASSESSMENT — CONFRONTATIONAL VISUAL FIELD TEST (CVF)
OD_FINDINGS: FULL
OS_FINDINGS: FULL

## 2024-05-22 ENCOUNTER — OUTPATIENT (OUTPATIENT)
Dept: OUTPATIENT SERVICES | Facility: HOSPITAL | Age: 64
LOS: 1 days | Discharge: HOME | End: 2024-05-22

## 2024-05-22 DIAGNOSIS — J34.2 DEVIATED NASAL SEPTUM: Chronic | ICD-10-CM

## 2024-05-22 DIAGNOSIS — S83.512S SPRAIN OF ANTERIOR CRUCIATE LIGAMENT OF LEFT KNEE, SEQUELA: Chronic | ICD-10-CM

## 2024-05-22 DIAGNOSIS — E83.119 HEMOCHROMATOSIS, UNSPECIFIED: ICD-10-CM

## 2024-05-29 ENCOUNTER — APPOINTMENT (OUTPATIENT)
Dept: HEMATOLOGY ONCOLOGY | Facility: CLINIC | Age: 64
End: 2024-05-29
Payer: COMMERCIAL

## 2024-05-29 ENCOUNTER — RESULT REVIEW (OUTPATIENT)
Age: 64
End: 2024-05-29

## 2024-05-29 VITALS
WEIGHT: 274.78 LBS | RESPIRATION RATE: 16 BRPM | BODY MASS INDEX: 43.13 KG/M2 | HEIGHT: 67 IN | HEART RATE: 63 BPM | DIASTOLIC BLOOD PRESSURE: 77 MMHG | SYSTOLIC BLOOD PRESSURE: 131 MMHG | OXYGEN SATURATION: 96 % | TEMPERATURE: 98.3 F

## 2024-05-29 DIAGNOSIS — E83.110 HEREDITARY HEMOCHROMATOSIS: ICD-10-CM

## 2024-05-29 LAB
ALBUMIN SERPL ELPH-MCNC: 4.5 G/DL
ALP BLD-CCNC: 114 U/L
ALT SERPL-CCNC: 24 U/L
ANION GAP SERPL CALC-SCNC: 10 MMOL/L
AST SERPL-CCNC: 16 U/L
BASOPHILS # BLD AUTO: 0.04 K/UL — SIGNIFICANT CHANGE UP (ref 0–0.2)
BASOPHILS NFR BLD AUTO: 0.7 % — SIGNIFICANT CHANGE UP (ref 0–2)
BILIRUB SERPL-MCNC: 0.4 MG/DL
BUN SERPL-MCNC: 20 MG/DL
CALCIUM SERPL-MCNC: 8.9 MG/DL
CHLORIDE SERPL-SCNC: 105 MMOL/L
CO2 SERPL-SCNC: 27 MMOL/L
CREAT SERPL-MCNC: 1.08 MG/DL
EGFR: 77 ML/MIN/1.73M2
EOSINOPHIL # BLD AUTO: 0.11 K/UL — SIGNIFICANT CHANGE UP (ref 0–0.5)
EOSINOPHIL NFR BLD AUTO: 1.9 % — SIGNIFICANT CHANGE UP (ref 0–6)
FERRITIN SERPL-MCNC: 387 NG/ML
GLUCOSE SERPL-MCNC: 115 MG/DL
HCT VFR BLD CALC: 47.5 % — SIGNIFICANT CHANGE UP (ref 39–50)
HGB BLD-MCNC: 16.4 G/DL — SIGNIFICANT CHANGE UP (ref 13–17)
IMM GRANULOCYTES NFR BLD AUTO: 0.5 % — SIGNIFICANT CHANGE UP (ref 0–0.9)
IRON SATN MFR SERPL: 33 %
IRON SERPL-MCNC: 99 UG/DL
LYMPHOCYTES # BLD AUTO: 1.31 K/UL — SIGNIFICANT CHANGE UP (ref 1–3.3)
LYMPHOCYTES # BLD AUTO: 22.8 % — SIGNIFICANT CHANGE UP (ref 13–44)
MCHC RBC-ENTMCNC: 32.6 PG — SIGNIFICANT CHANGE UP (ref 27–34)
MCHC RBC-ENTMCNC: 34.5 G/DL — SIGNIFICANT CHANGE UP (ref 32–36)
MCV RBC AUTO: 94.4 FL — SIGNIFICANT CHANGE UP (ref 80–100)
MONOCYTES # BLD AUTO: 0.62 K/UL — SIGNIFICANT CHANGE UP (ref 0–0.9)
MONOCYTES NFR BLD AUTO: 10.8 % — SIGNIFICANT CHANGE UP (ref 2–14)
NEUTROPHILS # BLD AUTO: 3.64 K/UL — SIGNIFICANT CHANGE UP (ref 1.8–7.4)
NEUTROPHILS NFR BLD AUTO: 63.3 % — SIGNIFICANT CHANGE UP (ref 43–77)
NRBC # BLD: 0 /100 WBCS — SIGNIFICANT CHANGE UP (ref 0–0)
PLATELET # BLD AUTO: 161 K/UL — SIGNIFICANT CHANGE UP (ref 150–400)
POTASSIUM SERPL-SCNC: 4.2 MMOL/L
PROT SERPL-MCNC: 6.5 G/DL
RBC # BLD: 5.03 M/UL — SIGNIFICANT CHANGE UP (ref 4.2–5.8)
RBC # FLD: 12.5 % — SIGNIFICANT CHANGE UP (ref 10.3–14.5)
SODIUM SERPL-SCNC: 142 MMOL/L
TIBC SERPL-MCNC: 305 UG/DL
UIBC SERPL-MCNC: 206 UG/DL
WBC # BLD: 5.75 K/UL — SIGNIFICANT CHANGE UP (ref 3.8–10.5)
WBC # FLD AUTO: 5.75 K/UL — SIGNIFICANT CHANGE UP (ref 3.8–10.5)

## 2024-05-29 PROCEDURE — 99213 OFFICE O/P EST LOW 20 MIN: CPT

## 2024-05-29 NOTE — HISTORY OF PRESENT ILLNESS
[de-identified] : 63 year old male with history of obstructive sleep apnea, fatty liver disease for about 20 + years, and chronic fatigue for years. He told his PCP he typicallly feels better after having many tubes of blood drawn at the same time for lab testing. Therefore, labs were done by PCP on 1/3/24 with Iron- 143 mcg/dL, transferrin saturation- 48%, Ferritin- 751 ng/mL.  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 He is now referred for hematology consult.   Family Medical History- no known h/o Hemochromatosis [de-identified] : Since initial consult visit on 4/1/24, he describes an achy sensation near right flank x 7-8 months up to 1 year. He still has to see his PCP to evaluate this problem. He has fatigue, but denies fever, shortness of breath at rest, vomiting. He has been dieting to lose weight and has lost 31 bs since Jan 2024.

## 2024-05-29 NOTE — ASSESSMENT
[FreeTextEntry1] : 63 year old male with history of obstructive sleep apnea, fatty liver disease for about 20 + years, and chronic fatigue for years. He told his PCP he typicallly feels better after having many tubes of blood drawn at the same time for lab testing. Therefore, labs were done by PCP on 1/3/24 with Iron- 143 mcg/dL, transferrin saturation- 48%, Ferritin- 751 ng/mL.  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 He is now referred for hematology consult.  On 4/1/24, Ferritin- 426 ng/mL and iron- 113 mcg/dL, transferrin saturation- 36%.  Family Medical History- no known h/o Hemochromatosis  Plan- Check iron studies today Recommend Hepatology consult for NAFLD, as Ferritin is elevated but serum iron and transferrin saturation are within normal range. It is scheduled for 9/23/24.  MRI of the liver to evaluate for iron deposition- order placed Avoid iron tablet and Vitamin C supplementation.  He does not donate blood due to Nondenominational beliefs.   RTC in 4 months.

## 2024-05-29 NOTE — REASON FOR VISIT
[Follow-Up Visit] : a follow-up visit for [FreeTextEntry2] : hereditary hemochromatosis, compound heterozygous(C282Y, H63D)

## 2024-05-29 NOTE — PHYSICAL EXAM
[Obese] : obese [Normal] : normoactive bowel sounds, soft and nontender, no hepatosplenomegaly or masses appreciated [de-identified] : RRR, normal S1S2 [de-identified] : no pitting edema [de-identified] : no rash [de-identified] : A & O x 4

## 2024-05-29 NOTE — REVIEW OF SYSTEMS
[Fever] : no fever [Chills] : no chills [Fatigue] : fatigue [Mucosal Pain] : no mucosal pain [Chest Pain] : no chest pain [Lower Ext Edema] : no lower extremity edema [Shortness Of Breath] : shortness of breath [Cough] : no cough [Abdominal Pain] : no abdominal pain [Vomiting] : no vomiting [Skin Rash] : no skin rash [Dizziness] : no dizziness [Fainting] : no fainting [Easy Bleeding] : no tendency for easy bleeding [Easy Bruising] : no tendency for easy bruising [FreeTextEntry7] : no nausea; no diarrhea

## 2024-05-30 ENCOUNTER — RESULT REVIEW (OUTPATIENT)
Age: 64
End: 2024-05-30

## 2024-06-17 ENCOUNTER — APPOINTMENT (OUTPATIENT)
Dept: MRI IMAGING | Facility: CLINIC | Age: 64
End: 2024-06-17
Payer: COMMERCIAL

## 2024-06-17 ENCOUNTER — OUTPATIENT (OUTPATIENT)
Dept: OUTPATIENT SERVICES | Facility: HOSPITAL | Age: 64
LOS: 1 days | End: 2024-06-17
Payer: COMMERCIAL

## 2024-06-17 DIAGNOSIS — Z00.8 ENCOUNTER FOR OTHER GENERAL EXAMINATION: ICD-10-CM

## 2024-06-17 DIAGNOSIS — E83.110 HEREDITARY HEMOCHROMATOSIS: ICD-10-CM

## 2024-06-17 DIAGNOSIS — J34.2 DEVIATED NASAL SEPTUM: Chronic | ICD-10-CM

## 2024-06-17 DIAGNOSIS — S83.512S SPRAIN OF ANTERIOR CRUCIATE LIGAMENT OF LEFT KNEE, SEQUELA: Chronic | ICD-10-CM

## 2024-06-17 PROCEDURE — 74181 MRI ABDOMEN W/O CONTRAST: CPT | Mod: 26

## 2024-06-17 PROCEDURE — 74181 MRI ABDOMEN W/O CONTRAST: CPT

## 2024-07-28 NOTE — ED ADULT NURSE NOTE - IN THE PAST 12 MONTHS HAVE YOU USED DRUGS OTHER THAN THOSE REQUIRED FOR MEDICAL REASON?
Formerly [facility], [facility]–1983 [phone]    [facility] VIDEO VISIT PROGRESS NOTE    CHIEF COMPLAINT  Chief Complaint   Patient presents with   • STD     3-4 days of Symptoms.STD screening   •  Symptoms   • Video Visit       SUBJECTIVE:  HISTORY OF PRESENT ILLNESS:   Phil Prather is a 38 year old male who consents to a two-way Video Visit (V-Visit) for evaluation of STI testing. Started about 3-4 days ago, reports burning with urination. Also reports white discharge with funny odor. Denies fevers, chills, abdominal pain. Denies any known exposures.    REVIEW OF SYSTEMS   A review of systems was performed and findings relevant to this complaint are included in the HPI.    HISTORIES  Current medications, allergies, past medical history, past surgical history and social history have been reviewed and updated in the electronic medical record.    ALLERGIES  ALLERGIES:  No Known Allergies     MEDICATIONS  Current Outpatient Medications   Medication Sig   • sulfamethoxazole-trimethoprim (BACTRIM DS) 800-160 MG per tablet Take 1 tablet by mouth in the morning and 1 tablet in the evening. Do all this for 5 days.     No current facility-administered medications for this visit.      OBJECTIVE  PHYSICAL EXAM:   Information acquired with both clinical support staff and patient assistance using Tyto device, demonstration, and feedback due to two-way video visit method of visit. Portions of assessment may be difficult to visualize precisely given nature of technology and limitations of assessment with virtual platform.  Vitals: Visit Vitals  /85   Pulse 76   Temp 98.6 °F (37 °C)   Resp 20   Ht 6' (1.829 m)   Wt 77.9 kg (171 lb 11.8 oz)   SpO2 98%   BMI 23.29 kg/m²        GENERAL: Awake, alert, oriented and in no acute distress.  SKIN: Appears pink and dry.  HEENT: Normocephalic, atraumatic.   Eyes: Right eye: conjunctiva normal and lid and lashes are normal. Left eye: conjunctiva normal and lid and lashes are  normal.   CARDIAC: Normal rate, normal rhythm, no murmurs, no gallops, no rubs.  RESPIRATORY: Lungs are clear to auscultation throughout, no crackles, rales, rhonchi, or wheezing. Able to speak in complete sentences without evidence of dyspnea. Breathing effort is normal.    PSYCHIATRIC: The patient was able to demonstrate good judgement and reason without abnormal affect or abnormal behaviors during the examination.    Office Visit on 07/28/2024   Component Date Value Ref Range Status   • Color, UA 07/28/2024 Yellow   Final   • POCT Appearance 07/28/2024 Cloudy   Final   • POCT Glucose Urine 07/28/2024 Negative  mmol/L Final   • Bilirubin, POC 07/28/2024 Negative  Negative, Trace mg/dL Final   • POCT Ketones 07/28/2024 Negative  mmol/L Final   • Specific Gravity, UA 07/28/2024 1.020  1.000, 1.005, 1.010, 1.015, 1.020, 1.025, 1.030, <= 1.005 Final   • POCT Occult Blood 07/28/2024 Small  Jose Enrique/uL Final   • POCT pH 07/28/2024 7.0  5.0, 5.5, 6.0, 6.5, 7.0, 7.5, 8.0, 8.5 Final   • POCT Protein 07/28/2024 Negative  g/L Final   • Urobilinogen, POC 07/28/2024 1.0  Normal, 1+ (1 mg/dl), 1.0, 0.2, 0.1 mg/dl Final   • Urine Nitrite 07/28/2024 Negative  Negative Final   • Leukocytes, POC 07/28/2024 Large (A)  Negative Roque/uL Final         ASSESSMENT/PLAN   Dysuria  - Chlamydia/Gonorrhea by Nucleic Acid Amplification; Future  - Urine, Bacterial Culture  - POCT Urinalysis  - Chlamydia/Gonorrhea by Nucleic Acid Amplification  - sulfamethoxazole-trimethoprim (BACTRIM DS) 800-160 MG per tablet; Take 1 tablet by mouth in the morning and 1 tablet in the evening. Do all this for 5 days.  Dispense: 10 tablet; Refill: 0    Penile discharge  - Chlamydia/Gonorrhea by Nucleic Acid Amplification; Future  - Chlamydia/Gonorrhea by Nucleic Acid Amplification    Routine screening for STI (sexually transmitted infection)  - Chlamydia/Gonorrhea by Nucleic Acid Amplification; Future  - Chlamydia/Gonorrhea by Nucleic Acid  Amplification        FOLLOW-UP   Return for As needed .  If the patient has unresolved symptoms, they have been advised to seek an in person evaluation with their primary care provider or at an Urgent Care/Immediate Care.    If symptoms get worse, the patient should be seen immediately at an Urgent Care/Immediate Care or the Emergency Department.      PATIENT INSTRUCTIONS  Included in After Visit Summary  The patient verbalizes understanding of the diagnosis and plan of care. There were no further questions or concerns.   They were advised to contact the [facility] RN with any questions at [phone].    CONSENT:  This visit is being performed virtually via Non-integrated Video Visit. Consent to treat includes permission to submit charges to the patient's insurance. It was shared that without being seen and evaluated in person, there is a risk that the information and/or assessment may be incomplete or inaccurate. This video visit may be discontinued by patient or clinician, if it is felt that the videoconferencing connections are not adequate for his situation.   [facility]  Patient is located at the urgent care in [facility]    ORIN Mota  7/28/2024  1:27 PM   No

## 2024-09-19 ENCOUNTER — OUTPATIENT (OUTPATIENT)
Dept: OUTPATIENT SERVICES | Facility: HOSPITAL | Age: 64
LOS: 1 days | Discharge: HOME | End: 2024-09-19

## 2024-09-19 DIAGNOSIS — J34.2 DEVIATED NASAL SEPTUM: Chronic | ICD-10-CM

## 2024-09-19 DIAGNOSIS — S83.512S SPRAIN OF ANTERIOR CRUCIATE LIGAMENT OF LEFT KNEE, SEQUELA: Chronic | ICD-10-CM

## 2024-09-19 DIAGNOSIS — E83.119 HEMOCHROMATOSIS, UNSPECIFIED: ICD-10-CM

## 2024-09-23 ENCOUNTER — APPOINTMENT (OUTPATIENT)
Dept: HEPATOLOGY | Facility: CLINIC | Age: 64
End: 2024-09-23
Payer: SELF-PAY

## 2024-09-23 VITALS
BODY MASS INDEX: 43 KG/M2 | TEMPERATURE: 97.1 F | HEIGHT: 67 IN | SYSTOLIC BLOOD PRESSURE: 141 MMHG | OXYGEN SATURATION: 95 % | RESPIRATION RATE: 16 BRPM | WEIGHT: 274 LBS | DIASTOLIC BLOOD PRESSURE: 83 MMHG | HEART RATE: 67 BPM

## 2024-09-23 DIAGNOSIS — I10 ESSENTIAL (PRIMARY) HYPERTENSION: ICD-10-CM

## 2024-09-23 DIAGNOSIS — E66.01 MORBID (SEVERE) OBESITY DUE TO EXCESS CALORIES: ICD-10-CM

## 2024-09-23 DIAGNOSIS — K76.0 FATTY (CHANGE OF) LIVER, NOT ELSEWHERE CLASSIFIED: ICD-10-CM

## 2024-09-23 DIAGNOSIS — E78.5 HYPERLIPIDEMIA, UNSPECIFIED: ICD-10-CM

## 2024-09-23 DIAGNOSIS — R53.82 CHRONIC FATIGUE, UNSPECIFIED: ICD-10-CM

## 2024-09-23 DIAGNOSIS — M54.9 DORSALGIA, UNSPECIFIED: ICD-10-CM

## 2024-09-23 DIAGNOSIS — E83.110 HEREDITARY HEMOCHROMATOSIS: ICD-10-CM

## 2024-09-23 DIAGNOSIS — Z13.1 ENCOUNTER FOR SCREENING FOR DIABETES MELLITUS: ICD-10-CM

## 2024-09-23 DIAGNOSIS — G89.29 DORSALGIA, UNSPECIFIED: ICD-10-CM

## 2024-09-23 DIAGNOSIS — R07.81 PLEURODYNIA: ICD-10-CM

## 2024-09-23 DIAGNOSIS — Z14.8 GENETIC CARRIER OF OTHER DISEASE: ICD-10-CM

## 2024-09-23 DIAGNOSIS — R35.0 FREQUENCY OF MICTURITION: ICD-10-CM

## 2024-09-23 DIAGNOSIS — G47.33 OBSTRUCTIVE SLEEP APNEA (ADULT) (PEDIATRIC): ICD-10-CM

## 2024-09-23 DIAGNOSIS — N28.1 CYST OF KIDNEY, ACQUIRED: ICD-10-CM

## 2024-09-23 DIAGNOSIS — R39.11 HESITANCY OF MICTURITION: ICD-10-CM

## 2024-09-23 PROCEDURE — 99204 OFFICE O/P NEW MOD 45 MIN: CPT

## 2024-09-23 PROCEDURE — G2211 COMPLEX E/M VISIT ADD ON: CPT

## 2024-09-23 NOTE — HISTORY OF PRESENT ILLNESS
[FreeTextEntry1] : Mr. BALLESTEROS is a 64-year-old man who was referred by his hematologist, Dr. Brooke, because of fatty liver.  He has HTN, HLD, severe obesity with BMI 42, JULISSA awaiting a CPAP machine,  He was diagnosed with hereditary hemochromatosis was found in 2022 after he was evaluated for fatigue. He has not had phlebotomies and takes a supplement, "ironology iron block", with Bosewellia extract, to help with this, which he looked up and takes when he eats meat. He complains of frequent urination every 15 minutes for >6 months, with urge, but without dysuria. It comes out slowly and dribbles. He denies incontinence. He had prostatitis many years ago.  He is here today with his wife, and recently ran out of insurance.   Bloodwork from 5/29/24 showed normal LFTs with ALT 24, low-normal  G/L, normal creatinine 1.08 mg/dL, normal ferritin 387, iron panel with sat 33%.  - 1/3/24 scanned: iron sat. 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   Alcohol history: occasionally small bimal. SHx: , works in construction Weight history: 274 lbs, BMI 43.0 in 9/2024. Max weight was 300 lbs in 1/2024. Remedies/OTC meds:   ROS: Constitutional: no fever, fatigue, weight gain/loss. Eyes: no eye pain or discharge. ENT: no nosebleed, earache, change in hearing. CVS: denies chest pain, palpitations, claudication, irregular heartbeat. Resp: denies SOB, wheezing, cough, SOB on exertion. GI: denies abdominal pain, vomiting, diarrhea, heartburn, melena. Genitourinary: denies dysuria, incontinence. Musculoskeletal: denies joint pain, joint stiffness. Integumentary: denies pruritus, skin lesions, rash. Neuro: denies confusion, dizziness, fainting. Psych: denies anxiety, depression, change in personality. Endo: denies muscle weakness. Heme/lymph: denies easy bleeding and bruising.   Test results:  - 7/17/24 MRI abdomen wo: normal liver, spleen, and gallbladder. Mild hepatic steatosis, fat fraction 9%. Mild hepatic iron overload 2.3 mg/g dry weight or 41 umol/g dry weight. Large duodenal diverticulum at third portion. [liver stiffness was not measured]. R kidney cyst, hemorrhagic. - 1/10/24 [facility] abdomen ([facility]): fatty liver, hepatomegaly. Gallbladder normal. Complicated L renal cyst - 1/3/24 scanned: iron sat. 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 - colonoscopy: 2015 elsewhere   Physical exam: Gen: A&Ox3, NAD, obese HEENT: normal outer ears & nose, PERRL, mucus membranes moist, anicteric, no lymphadenopathy CVS: regular rate and rhythm, no murmur Pulm: vesicular breath sounds Abdomen: normal bowel sounds, soft, nontender, no hepatosplenomegaly Legs: no edema, no clubbing Musculoskeletal: normal gait. Tender L lower ribs posteriorly. Tender point R lower rib posteriorly.  Skin: no rash Neuro: no asterixis, EOMI Psych: normal affect

## 2024-09-23 NOTE — ASSESSMENT
[FreeTextEntry1] : Mr. BALLESTEROS is a 64-year-old man who was referred in 9/2024 by his hematologist, Dr. Brooke, because of fatty liver.  # MASLD - social alcohol use - mild hepatic steatosis on MRI 6/2024. Elastography not performed - LFTs normal 9/2024 - PLT low normal, 174 in 9/2024. Poss. cause may be portal HTN.  # severe obesity, BMI 42 in 9/2024. Lost 25 lbs by dieting by 9/2024. - HTN, HLD - JULISSA awaiting a CPAP machine. Chronic daytime fatigue is likely due to that. - bilateral rib pain and tenderness posteriorly - likely due to pressure from fat tissue. DD includes costochondritis of other etiology  # mild hepatic iron-overload on MRI 6/2024. 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 High normal Hb 17.1 g/dL in early 2024. Not on any treatment.  # back pain, limits ability to walk  # pollakisuria, urinary hesitancy: likely BPH # hemorrhagic kidney cyst  Multiple benefits of drastic weight loss discussed, including decreased risk for heart attacks and strokes, liver disease, sleep apnea and his fatigue, rib pain, other joint pain. Benefits and risks of GLP-1 agonists discussed.  I discussed that he will not likely develop iron overload to an extent that causes significant liver disease, but that this can be monitored. Saw me today after his insurance recently lapsed, is trying to regain insurance.  Plan: - bloodwork and fibroscan before next visit in 3 months. May need MR elastography if fibroscan unsuccessful due to body habitus.  - decreased caloric intake, vigorous exercise as tolerated - no GLP-1 agonist for now per his preference - refer to urologist - kidney cyst, hemorrhagic: f/u with his PCP/urology - colon cancer screening: will address at next visit

## 2024-09-24 LAB
ALBUMIN SERPL ELPH-MCNC: 4.4 G/DL
ALP BLD-CCNC: 105 U/L
ALT SERPL-CCNC: 21 U/L
ANION GAP SERPL CALC-SCNC: 10 MMOL/L
AST SERPL-CCNC: 15 U/L
BASOPHILS # BLD AUTO: 0.05 K/UL
BASOPHILS NFR BLD AUTO: 0.8 %
BILIRUB SERPL-MCNC: 0.4 MG/DL
BUN SERPL-MCNC: 14 MG/DL
CALCIUM SERPL-MCNC: 9.4 MG/DL
CHLORIDE SERPL-SCNC: 104 MMOL/L
CO2 SERPL-SCNC: 28 MMOL/L
CREAT SERPL-MCNC: 0.96 MG/DL
EGFR: 88 ML/MIN/1.73M2
EOSINOPHIL # BLD AUTO: 0.11 K/UL
EOSINOPHIL NFR BLD AUTO: 1.7 %
ESTIMATED AVERAGE GLUCOSE: 120 MG/DL
GLUCOSE SERPL-MCNC: 92 MG/DL
HBA1C MFR BLD HPLC: 5.8 %
HBV CORE IGG+IGM SER QL: NONREACTIVE
HBV SURFACE AB SER QL: NONREACTIVE
HBV SURFACE AG SER QL: NONREACTIVE
HCT VFR BLD CALC: 52.1 %
HCV AB SER QL: NONREACTIVE
HCV S/CO RATIO: 0.09 S/CO
HEPATITIS A IGG ANTIBODY: NONREACTIVE
HGB BLD-MCNC: 17.5 G/DL
IMM GRANULOCYTES NFR BLD AUTO: 1.2 %
LYMPHOCYTES # BLD AUTO: 1.53 K/UL
LYMPHOCYTES NFR BLD AUTO: 23.1 %
MAN DIFF?: NORMAL
MCHC RBC-ENTMCNC: 32.2 PG
MCHC RBC-ENTMCNC: 33.6 GM/DL
MCV RBC AUTO: 95.8 FL
MONOCYTES # BLD AUTO: 0.85 K/UL
MONOCYTES NFR BLD AUTO: 12.9 %
NEUTROPHILS # BLD AUTO: 3.99 K/UL
NEUTROPHILS NFR BLD AUTO: 60.3 %
PLATELET # BLD AUTO: 174 K/UL
POTASSIUM SERPL-SCNC: 4.3 MMOL/L
PROT SERPL-MCNC: 6.7 G/DL
RBC # BLD: 5.44 M/UL
RBC # FLD: 12.3 %
SODIUM SERPL-SCNC: 143 MMOL/L
WBC # FLD AUTO: 6.61 K/UL

## 2024-09-26 ENCOUNTER — APPOINTMENT (OUTPATIENT)
Dept: HEMATOLOGY ONCOLOGY | Facility: CLINIC | Age: 64
End: 2024-09-26

## 2024-10-17 ENCOUNTER — APPOINTMENT (OUTPATIENT)
Dept: HEMATOLOGY ONCOLOGY | Facility: CLINIC | Age: 64
End: 2024-10-17

## 2024-11-11 ENCOUNTER — APPOINTMENT (OUTPATIENT)
Dept: OTOLARYNGOLOGY | Facility: CLINIC | Age: 64
End: 2024-11-11

## 2024-12-16 ENCOUNTER — APPOINTMENT (OUTPATIENT)
Dept: HEPATOLOGY | Facility: CLINIC | Age: 64
End: 2024-12-16

## 2025-01-08 NOTE — ED PROVIDER NOTE - OBJECTIVE STATEMENT
"  58 y/o male with PMHx of HTN, costochondritis, sleep apnea presents to the ED c/o constant dull diffuse chest tightness/discomfort over past 2 weeks. Notes that raising left arm causes sudden back pain. +dizziness/lightheadedness x3 days. +SOB, JOSHI. +numbness, tingling to bilateral hands. Sent by Dr. Rousseau for EKG. Denies N/V, cough, leg swelling. Took 80mg ASA PTA. Notes broken sternum at 16-16 y/o, since which pt has had baseline CP, saw Dr. Ankit Herrera at time. Last cardio f/u was in 2010, nuclear stress test performed at . Nonsmoker. No known cardiac FHx. No significant PSHx.

## 2025-05-12 ENCOUNTER — OUTPATIENT (OUTPATIENT)
Dept: OUTPATIENT SERVICES | Facility: HOSPITAL | Age: 65
LOS: 1 days | End: 2025-05-12

## 2025-05-12 DIAGNOSIS — E83.119 HEMOCHROMATOSIS, UNSPECIFIED: ICD-10-CM

## 2025-05-12 DIAGNOSIS — J34.2 DEVIATED NASAL SEPTUM: Chronic | ICD-10-CM

## 2025-05-12 DIAGNOSIS — S83.512S SPRAIN OF ANTERIOR CRUCIATE LIGAMENT OF LEFT KNEE, SEQUELA: Chronic | ICD-10-CM

## 2025-05-14 ENCOUNTER — NON-APPOINTMENT (OUTPATIENT)
Age: 65
End: 2025-05-14

## 2025-05-14 ENCOUNTER — APPOINTMENT (OUTPATIENT)
Dept: HEMATOLOGY ONCOLOGY | Facility: CLINIC | Age: 65
End: 2025-05-14
Payer: MEDICARE

## 2025-05-14 VITALS
BODY MASS INDEX: 45.67 KG/M2 | OXYGEN SATURATION: 95 % | HEIGHT: 67 IN | HEART RATE: 79 BPM | WEIGHT: 291 LBS | TEMPERATURE: 98.3 F | SYSTOLIC BLOOD PRESSURE: 166 MMHG | DIASTOLIC BLOOD PRESSURE: 88 MMHG

## 2025-05-14 DIAGNOSIS — E83.110 HEREDITARY HEMOCHROMATOSIS: ICD-10-CM

## 2025-05-14 PROCEDURE — G2211 COMPLEX E/M VISIT ADD ON: CPT

## 2025-05-14 PROCEDURE — 99205 OFFICE O/P NEW HI 60 MIN: CPT

## 2025-06-16 ENCOUNTER — NON-APPOINTMENT (OUTPATIENT)
Age: 65
End: 2025-06-16

## 2025-06-16 ENCOUNTER — EMERGENCY (EMERGENCY)
Facility: HOSPITAL | Age: 65
LOS: 0 days | Discharge: HOME | End: 2025-06-16
Attending: STUDENT IN AN ORGANIZED HEALTH CARE EDUCATION/TRAINING PROGRAM
Payer: MEDICARE

## 2025-06-16 VITALS
SYSTOLIC BLOOD PRESSURE: 158 MMHG | RESPIRATION RATE: 20 BRPM | DIASTOLIC BLOOD PRESSURE: 77 MMHG | HEART RATE: 72 BPM | OXYGEN SATURATION: 97 %

## 2025-06-16 VITALS — HEIGHT: 67 IN

## 2025-06-16 DIAGNOSIS — J34.2 DEVIATED NASAL SEPTUM: Chronic | ICD-10-CM

## 2025-06-16 DIAGNOSIS — S83.512S SPRAIN OF ANTERIOR CRUCIATE LIGAMENT OF LEFT KNEE, SEQUELA: Chronic | ICD-10-CM

## 2025-06-16 DIAGNOSIS — R06.02 SHORTNESS OF BREATH: ICD-10-CM

## 2025-06-16 DIAGNOSIS — I10 ESSENTIAL (PRIMARY) HYPERTENSION: ICD-10-CM

## 2025-06-16 DIAGNOSIS — I45.10 UNSPECIFIED RIGHT BUNDLE-BRANCH BLOCK: ICD-10-CM

## 2025-06-16 DIAGNOSIS — R21 RASH AND OTHER NONSPECIFIC SKIN ERUPTION: ICD-10-CM

## 2025-06-16 DIAGNOSIS — Z88.5 ALLERGY STATUS TO NARCOTIC AGENT: ICD-10-CM

## 2025-06-16 LAB
ALBUMIN SERPL ELPH-MCNC: 4.2 G/DL — SIGNIFICANT CHANGE UP (ref 3.3–5)
ALP SERPL-CCNC: 96 U/L — SIGNIFICANT CHANGE UP (ref 40–120)
ALT FLD-CCNC: 61 U/L — SIGNIFICANT CHANGE UP (ref 12–78)
ANION GAP SERPL CALC-SCNC: 0 MMOL/L — LOW (ref 5–17)
APTT BLD: 35.6 SEC — SIGNIFICANT CHANGE UP (ref 26.1–36.8)
AST SERPL-CCNC: 28 U/L — SIGNIFICANT CHANGE UP (ref 15–37)
BASOPHILS # BLD AUTO: 0.04 K/UL — SIGNIFICANT CHANGE UP (ref 0–0.2)
BASOPHILS NFR BLD AUTO: 0.5 % — SIGNIFICANT CHANGE UP (ref 0–2)
BILIRUB SERPL-MCNC: 1 MG/DL — SIGNIFICANT CHANGE UP (ref 0.2–1.2)
BUN SERPL-MCNC: 13 MG/DL — SIGNIFICANT CHANGE UP (ref 7–23)
CALCIUM SERPL-MCNC: 9.6 MG/DL — SIGNIFICANT CHANGE UP (ref 8.5–10.1)
CHLORIDE SERPL-SCNC: 107 MMOL/L — SIGNIFICANT CHANGE UP (ref 96–108)
CO2 SERPL-SCNC: 32 MMOL/L — HIGH (ref 22–31)
CREAT SERPL-MCNC: 1.14 MG/DL — SIGNIFICANT CHANGE UP (ref 0.5–1.3)
EGFR: 71 ML/MIN/1.73M2 — SIGNIFICANT CHANGE UP
EGFR: 71 ML/MIN/1.73M2 — SIGNIFICANT CHANGE UP
EOSINOPHIL # BLD AUTO: 0.16 K/UL — SIGNIFICANT CHANGE UP (ref 0–0.5)
EOSINOPHIL NFR BLD AUTO: 2 % — SIGNIFICANT CHANGE UP (ref 0–6)
GLUCOSE SERPL-MCNC: 89 MG/DL — SIGNIFICANT CHANGE UP (ref 70–99)
HCT VFR BLD CALC: 53.5 % — HIGH (ref 39–50)
HGB BLD-MCNC: 17.8 G/DL — HIGH (ref 13–17)
IMM GRANULOCYTES # BLD AUTO: 0.04 K/UL — SIGNIFICANT CHANGE UP (ref 0–0.07)
IMM GRANULOCYTES NFR BLD AUTO: 0.5 % — SIGNIFICANT CHANGE UP (ref 0–0.9)
INR BLD: 0.97 RATIO — SIGNIFICANT CHANGE UP (ref 0.85–1.16)
LYMPHOCYTES # BLD AUTO: 1.55 K/UL — SIGNIFICANT CHANGE UP (ref 1–3.3)
LYMPHOCYTES NFR BLD AUTO: 19.4 % — SIGNIFICANT CHANGE UP (ref 13–44)
MCHC RBC-ENTMCNC: 31.6 PG — SIGNIFICANT CHANGE UP (ref 27–34)
MCHC RBC-ENTMCNC: 33.3 G/DL — SIGNIFICANT CHANGE UP (ref 32–36)
MCV RBC AUTO: 95 FL — SIGNIFICANT CHANGE UP (ref 80–100)
MONOCYTES # BLD AUTO: 0.8 K/UL — SIGNIFICANT CHANGE UP (ref 0–0.9)
MONOCYTES NFR BLD AUTO: 10 % — SIGNIFICANT CHANGE UP (ref 2–14)
NEUTROPHILS # BLD AUTO: 5.42 K/UL — SIGNIFICANT CHANGE UP (ref 1.8–7.4)
NEUTROPHILS NFR BLD AUTO: 67.6 % — SIGNIFICANT CHANGE UP (ref 43–77)
NRBC # BLD AUTO: 0 K/UL — SIGNIFICANT CHANGE UP (ref 0–0)
NRBC # FLD: 0 K/UL — SIGNIFICANT CHANGE UP (ref 0–0)
NRBC BLD AUTO-RTO: 0 /100 WBCS — SIGNIFICANT CHANGE UP (ref 0–0)
NT-PROBNP SERPL-SCNC: 57 PG/ML — SIGNIFICANT CHANGE UP (ref 0–125)
PLATELET # BLD AUTO: 178 K/UL — SIGNIFICANT CHANGE UP (ref 150–400)
PMV BLD: 11.3 FL — SIGNIFICANT CHANGE UP (ref 7–13)
POTASSIUM SERPL-MCNC: 3.6 MMOL/L — SIGNIFICANT CHANGE UP (ref 3.5–5.3)
POTASSIUM SERPL-SCNC: 3.6 MMOL/L — SIGNIFICANT CHANGE UP (ref 3.5–5.3)
PROT SERPL-MCNC: 7.3 GM/DL — SIGNIFICANT CHANGE UP (ref 6–8.3)
PROTHROM AB SERPL-ACNC: 11.4 SEC — SIGNIFICANT CHANGE UP (ref 9.9–13.4)
RBC # BLD: 5.63 M/UL — SIGNIFICANT CHANGE UP (ref 4.2–5.8)
RBC # FLD: 12.4 % — SIGNIFICANT CHANGE UP (ref 10.3–14.5)
SODIUM SERPL-SCNC: 139 MMOL/L — SIGNIFICANT CHANGE UP (ref 135–145)
TROPONIN I, HIGH SENSITIVITY RESULT: 15.1 NG/L — SIGNIFICANT CHANGE UP
WBC # BLD: 8.01 K/UL — SIGNIFICANT CHANGE UP (ref 3.8–10.5)
WBC # FLD AUTO: 8.01 K/UL — SIGNIFICANT CHANGE UP (ref 3.8–10.5)

## 2025-06-16 PROCEDURE — 93970 EXTREMITY STUDY: CPT | Mod: 26

## 2025-06-16 PROCEDURE — 85610 PROTHROMBIN TIME: CPT

## 2025-06-16 PROCEDURE — 93010 ELECTROCARDIOGRAM REPORT: CPT

## 2025-06-16 PROCEDURE — 84484 ASSAY OF TROPONIN QUANT: CPT

## 2025-06-16 PROCEDURE — 93970 EXTREMITY STUDY: CPT

## 2025-06-16 PROCEDURE — 83880 ASSAY OF NATRIURETIC PEPTIDE: CPT

## 2025-06-16 PROCEDURE — 71045 X-RAY EXAM CHEST 1 VIEW: CPT | Mod: 26

## 2025-06-16 PROCEDURE — 85025 COMPLETE CBC W/AUTO DIFF WBC: CPT

## 2025-06-16 PROCEDURE — 71045 X-RAY EXAM CHEST 1 VIEW: CPT

## 2025-06-16 PROCEDURE — 80053 COMPREHEN METABOLIC PANEL: CPT

## 2025-06-16 PROCEDURE — 36415 COLL VENOUS BLD VENIPUNCTURE: CPT

## 2025-06-16 PROCEDURE — 93005 ELECTROCARDIOGRAM TRACING: CPT

## 2025-06-16 PROCEDURE — 36000 PLACE NEEDLE IN VEIN: CPT

## 2025-06-16 PROCEDURE — 85730 THROMBOPLASTIN TIME PARTIAL: CPT

## 2025-06-16 PROCEDURE — 99285 EMERGENCY DEPT VISIT HI MDM: CPT | Mod: 25

## 2025-06-16 PROCEDURE — 99285 EMERGENCY DEPT VISIT HI MDM: CPT

## 2025-06-16 NOTE — ED PROVIDER NOTE - PATIENT PORTAL LINK FT
You can access the FollowMyHealth Patient Portal offered by [facility] by registering at the following website: [email or web address]. By joining [facility]’s FollowMyHealth portal, you will also be able to view your health information using other applications (apps) compatible with our system.

## 2025-06-16 NOTE — ED ADULT NURSE NOTE - NS_ED_NURSE_TEACHING_TOPIC_ED_A_ED
Pt educated on all d/c instructions with return verbal understanding of all d/c instructions to myself at this time./Cardiac

## 2025-06-16 NOTE — ED PROVIDER NOTE - NSFOLLOWUPINSTRUCTIONS_ED_ALL_ED_FT
Acute Rash    WHAT YOU NEED TO KNOW:    A rash is irritation, redness, or itchiness in the skin or mucus membranes. Mucus membranes are areas such as the lining of your nose or throat. Acute means the rash starts suddenly, worsens quickly, and lasts a short time. An acute rash may be caused by a disease, such as hepatitis or vasculitis. The rash may be a reaction to something you are allergic to, such as certain foods, or latex. Certain medicines, including antibiotics, NSAIDs, prescription pain medicine, and aspirin can also cause a rash.     DISCHARGE INSTRUCTIONS:    Return to the emergency department if:     You have sudden trouble breathing or chest pain.      You are vomiting, have a headache or muscle aches, and your throat hurts.    Contact your healthcare provider if:     You have a fever.      You get open wounds from scratching your skin, or you have a wound that is red, swollen, or painful.       Your rash lasts longer than 3 months.      You have swelling or pain in your joints.       You have questions or concerns about your condition or care.    Medicines: If your rash does not go away on its own, you may need the following medicines:     Antihistamines may be given to help decrease itching.       Steroids may be given to decrease inflammation.      Antibiotics help fight or prevent a bacterial infection.       Take your medicine as directed. Contact your healthcare provider if you think your medicine is not helping or if you have side effects. Tell him of her if you are allergic to any medicine. Keep a list of the medicines, vitamins, and herbs you take. Include the amounts, and when and why you take them. Bring the list or the pill bottles to follow-up visits. Carry your medicine list with you in case of an emergency.    Prevent a rash or care for your skin when you have a rash: Dry skin can lead to more problems. Do not scratch your skin if it itches. You may cause a skin infection by scratching. The following may prevent dry skin, and help your skin look better:     Use thick cream lotions or petroleum jelly to help soothe your rash. These products work well on areas with thick skin, such as your feet. Cool compresses may also be used to soothe your skin. Apply a cool compress or a cool, wet towel, and then cover it with a dry towel.       Use lukewarm water when you bathe. Hot water may damage your skin more. Pat your skin dry. Do not rub your skin with a towel.       Use detergents, soaps, shampoos, and bubble baths made for sensitive skin. Wear clothes that are made of cotton instead of nylon or wool. Cotton is softer, so it will not hurt your skin as much.    Follow up with your healthcare provider as directed: You may need to see a dermatologist if healthcare providers do not know what is causing your rash. You may also need to see a dermatologist if your rash does not get better even with treatment. You may need to see a dietitian if you have allergies to foods. Write down your questions so you remember to ask them during your visits.    Shortness of Breath    WHAT YOU NEED TO KNOW:    Shortness of breath is a feeling that you cannot get enough air when you breathe in. You may have this feeling only during activity, or all the time. Your symptoms can range from mild to severe. Shortness of breath may be a sign of a serious health condition that needs immediate care.    DISCHARGE INSTRUCTIONS:    Return to the emergency department if:     Your signs and symptoms are the same or worse within 24 hours of treatment.       The skin over your ribs or on your neck sinks in when you breathe.       You feel confused or dizzy.    Contact your healthcare provider if:     You have new or worsening symptoms.      You have questions or concerns about your condition or care.    Medicines:     Medicines may be used to treat the cause of your symptoms. You may need medicine to treat a bacterial infection or reduce anxiety. Other medicines may be used to open your airway, reduce swelling, or remove extra fluid. If you have a heart condition, you may need medicine to help your heart beat more strongly or regularly.      Take your medicine as directed. Contact your healthcare provider if you think your medicine is not helping or if you have side effects. Tell him or her if you are allergic to any medicine. Keep a list of the medicines, vitamins, and herbs you take. Include the amounts, and when and why you take them. Bring the list or the pill bottles to follow-up visits. Carry your medicine list with you in case of an emergency.    Manage shortness of breath:     Create an action plan. You and your healthcare provider can work together to create a plan for how to handle shortness of breath. The plan can include daily activities, treatment changes, and what to do if you have severe breathing problems.      Lean forward on your elbows when you sit. This helps your lungs expand and may make it easier to breathe.      Use pursed-lip breathing any time you feel short of breath. Breathe in through your nose and then slowly breathe out through your mouth with your lips slightly puckered. It should take you twice as long to breathe out as it did to breathe in.Breathe in Breathe out           Do not smoke. Nicotine and other chemicals in cigarettes and cigars can cause lung damage and make shortness of breath worse. Ask your healthcare provider for information if you currently smoke and need help to quit. E-cigarettes or smokeless tobacco still contain nicotine. Talk to your healthcare provider before you use these products.      Reach or maintain a healthy weight. Your healthcare provider can help you create a safe weight loss plan if you are overweight.      Exercise as directed. Exercise can help your lungs work more easily. Exercise can also help you lose weight if needed. Try to get at least 30 minutes of exercise most days of the week.    Follow up with your healthcare provider or specialist as directed: Write down your questions so you remember to ask them during your visits.    Hypertension    WHAT YOU NEED TO KNOW:    Hypertension is high blood pressure. Your blood pressure is the force of your blood moving against the walls of your arteries. Hypertension causes your blood pressure to get so high that your heart has to work much harder than normal. This can damage your heart. The cause of hypertension may not be known. This is called essential or primary hypertension. Hypertension caused by another medical condition, such as kidney disease, is called secondary hypertension.    DISCHARGE INSTRUCTIONS:    Call 911 for any of the following:     You have chest pain.      You have any of the following signs of a heart attack:   Squeezing, pressure, or pain in your chest       and any of the following:   Discomfort or pain in your back, neck, jaw, stomach, or arm       Shortness of breath      Nausea or vomiting      Lightheadedness or a sudden cold sweat      You become confused or have difficulty speaking.      You suddenly feel lightheaded or have trouble breathing.    Return to the emergency department if:     You have a severe headache or vision loss.      You have weakness in an arm or leg.     Contact your healthcare provider if:     You feel faint, dizzy, confused, or drowsy.       You have been taking your blood pressure medicine but your pressure is higher than your provider says it should be.      You have questions or concerns about your condition or care.    Medicines: You may need any of the following:     Antihypertensives may be used to help lower your blood pressure. Several kinds of medicines are available. Your healthcare provider will choose medicines based on the kind of hypertension you have. You may need more than one type of medicine. Take the medicine exactly as directed.       Diuretics help decrease extra fluid that collects in your body. This will help lower your blood pressure. You may urinate more often while you take this medicine.      Cholesterol medicine helps lower your cholesterol level. A low cholesterol level helps prevent heart disease and makes it easier to control your blood pressure.       Take your medicine as directed. Contact your healthcare provider if you think your medicine is not helping or if you have side effects. Tell him or her if you are allergic to any medicine. Keep a list of the medicines, vitamins, and herbs you take. Include the amounts, and when and why you take them. Bring the list or the pill bottles to follow-up visits. Carry your medicine list with you in case of an emergency.    Follow up with your healthcare provider as directed: You will need to return to have your blood pressure checked and to have other lab tests done. Write down your questions so you remember to ask them during your visits.     Stages of hypertension: Blood Pressure Readings         Normal blood pressure is 119/79 or lower. Your healthcare provider may only check your blood pressure each year if it stays at a normal level.      Elevated blood pressure is 120/79 to 129/79. This is sometimes called prehypertension. Your healthcare provider may suggest lifestyle changes to help lower your blood pressure to a normal level. He or she may then check it again in 3 to 6 months.      Stage 1 hypertension is 130/80 to 139/89. Your provider may recommend lifestyle changes, medication, and checks every 3 to 6 months until your blood pressure is controlled.      Stage 2 hypertension is 140/90 or higher. Your provider will recommend lifestyle changes and have you take 2 kinds of hypertension medicines. You will also need to have your blood pressure checked monthly until it is controlled.    Manage hypertension:     Check your blood pressure at home. Avoid smoking, caffeine, and exercise at least 30 minutes before checking your blood pressure. Sit and rest for 5 minutes before you take your blood pressure. Extend your arm and support it on a flat surface. Your arm should be at the same level as your heart. Follow the directions that came with your blood pressure monitor. Check your blood pressure 2 times, 1 minute apart, before you take your medicine in the morning. Also check your blood pressure before your evening meal. Keep a record of your readings and bring it to your follow-up visits. Ask your healthcare provider what your blood pressure should be. How to take a Blood Pressure           Manage any other health conditions you have. Health conditions such as diabetes can increase your risk for hypertension. Follow your healthcare provider's instructions and take all your medicines as directed.       Ask about all medicines. Certain medicines can increase your blood pressure. Examples include oral birth control pills, decongestants, herbal supplements, and NSAIDs, such as ibuprofen. Your healthcare provider can tell you which medicines are safe for you to take. This includes prescription and over-the-counter medicines.    Lifestyle changes you can make to manage hypertension:     Limit sodium (salt) as directed. Too much sodium can affect your fluid balance. Check labels to find low-sodium or no-salt-added foods. Some low-sodium foods use potassium salts for flavor. Too much potassium can also cause health problems. Your healthcare provider will tell you how much sodium and potassium are safe for you to have in a day. He or she may recommend that you limit sodium to 2,300 mg a day.           Follow the meal plan recommended by your healthcare provider. A dietitian or your provider can give you more information on low-sodium plans or the DASH (Dietary Approaches to Stop Hypertension) eating plan. The DASH plan is low in sodium, unhealthy fats, and total fat. It is high in potassium, calcium, and fiber.            Exercise to maintain a healthy weight. Exercise at least 30 minutes per day, on most days of the week. This will help decrease your blood pressure. Ask your healthcare provider about the best exercise plan for you. Walking for Exercise           Decrease stress. This may help lower your blood pressure. Learn ways to relax, such as deep breathing or listening to music.      Limit alcohol as directed. Alcohol can increase your blood pressure. A drink of alcohol is 12 ounces of beer, 5 ounces of wine, or 1½ ounces of liquor.      Do not smoke. Nicotine and other chemicals in cigarettes and cigars can increase your blood pressure and also cause lung damage. Ask your healthcare provider for information if you currently smoke and need help to quit. E-cigarettes or smokeless tobacco still contain nicotine. Talk to your healthcare provider before you use these products.

## 2025-06-16 NOTE — ED STATDOCS - PROGRESS NOTE DETAILS
Anastasiia Marin for ED attending, Dr. Shankar   6/16/2025 17:50  65 year old male with PMHx of HTN not on medication for the past year, fatty liver, costochondritis, sleep apnea. presents to ED from urgent care for evaluation. patient reports on Friday he noticed a rash to his right lower extremity which is now also on his chest and back. patient reports that he also noticed swelling to his right lower extremity when the rash developed. patient states that he went to urgent care today for the rash, however his EKG was abnormal so he was bought to the ED. patient states he does have some lightheadedness, SOB and left sided chest pain .patient hypertensive in ED. Will send to main for full workup.

## 2025-06-16 NOTE — ED ADULT NURSE NOTE - OBJECTIVE STATEMENT
Pt presents to er with complaints of a rash to his rle, chest and back, RLE swelling, went to urgent care and was instructed to come in for further evaluation, denies use of AC, states he has non-radiating left sided chest pain and SOB upon exertion, lungs clear bilaterally, denies fevers, swelling to rle.

## 2025-06-16 NOTE — ED PROVIDER NOTE - CLINICAL SUMMARY MEDICAL DECISION MAKING FREE TEXT BOX
65 year old male with rash, non toxic and well-appearing, plan for screening EKG, bilateral duplex and reevaluate. 65 year old male with rash, non toxic and well-appearing, plan for screening EKG, bilateral duplex and reevaluate.    Rika DO: patient with hx of hemochromatosis; work up non actionable at this time; advised f/u with pmd, private cardiologist and derm as outpatient for further work up; patient and wife comfortable with plan of care; strict return precautions given.

## 2025-06-16 NOTE — ED ADULT TRIAGE NOTE - CHIEF COMPLAINT QUOTE
Pt presents to ED c/o rash. Pt reports rash that started on Friday, denies fever, cough, congestion, runny nose. Pt was seen at  and sent to ED for rash and abnormal EKG. Pt denies chest pain.

## 2025-06-16 NOTE — ED PROVIDER NOTE - SKIN, MLM
Skin normal color for race, warm, dry and intact. papular rash to the bilateral lower extremities and anterior trunk.

## 2025-06-16 NOTE — ED PROVIDER NOTE - OBJECTIVE STATEMENT
65 year old male with PMHx of HTN presents to ED for evaluation of rash. patient reports that last week he noticed a non itchy rash to his lower extremities and trunk. denies new detergents or lotions. no fever. patient went to urgent care today who sent him to the ED for evaluation. patient reports he sometimes has SOB with exertion however saw his cardiologist last week with a full workup. patient was noticed to be hypertensive in the ED and was started on blood pressure medications. 65 year old male with PMHx of HTN presents to ED for evaluation of rash. patient reports that last week he noticed a non itchy rash to his lower extremities and trunk. denies new detergents or lotions. no fever. patient went to urgent care today who sent him to the ED for evaluation. patient reports he sometimes has SOB with exertion however saw his cardiologist last week and patient is scheduled for full outpatient workup. patient was noticed to be hypertensive in the ED and was started on blood pressure medications last week by cardiologist.

## 2025-06-16 NOTE — ED STATDOCS - NSICDXPASTMEDICALHX_GEN_ALL_CORE_FT
PAST MEDICAL HISTORY:  Costochondritis     H/O chest pain     HTN (hypertension)     Sleep apnea